# Patient Record
Sex: FEMALE | Race: BLACK OR AFRICAN AMERICAN | NOT HISPANIC OR LATINO | Employment: FULL TIME | URBAN - METROPOLITAN AREA
[De-identification: names, ages, dates, MRNs, and addresses within clinical notes are randomized per-mention and may not be internally consistent; named-entity substitution may affect disease eponyms.]

---

## 2024-09-25 ENCOUNTER — OFFICE VISIT (OUTPATIENT)
Dept: OBSTETRICS AND GYNECOLOGY | Facility: CLINIC | Age: 24
End: 2024-09-25
Payer: COMMERCIAL

## 2024-09-25 ENCOUNTER — PATIENT ROUNDING (BHMG ONLY) (OUTPATIENT)
Dept: OBSTETRICS AND GYNECOLOGY | Facility: CLINIC | Age: 24
End: 2024-09-25
Payer: COMMERCIAL

## 2024-09-25 VITALS
DIASTOLIC BLOOD PRESSURE: 78 MMHG | HEIGHT: 65 IN | WEIGHT: 194.6 LBS | SYSTOLIC BLOOD PRESSURE: 124 MMHG | BODY MASS INDEX: 32.42 KG/M2

## 2024-09-25 DIAGNOSIS — N92.6 MISSED MENSES: ICD-10-CM

## 2024-09-25 DIAGNOSIS — Z32.01 POSITIVE URINE PREGNANCY TEST: Primary | ICD-10-CM

## 2024-09-25 DIAGNOSIS — E66.9 OBESITY (BMI 30-39.9): ICD-10-CM

## 2024-09-25 DIAGNOSIS — O21.9 NAUSEA AND VOMITING DURING PREGNANCY PRIOR TO 22 WEEKS GESTATION: ICD-10-CM

## 2024-09-25 LAB
B-HCG UR QL: POSITIVE
BILIRUB BLD-MCNC: NEGATIVE MG/DL
CLARITY, POC: CLEAR
COLOR UR: YELLOW
EXPIRATION DATE: ABNORMAL
GLUCOSE UR STRIP-MCNC: NEGATIVE MG/DL
INTERNAL NEGATIVE CONTROL: NEGATIVE
INTERNAL POSITIVE CONTROL: POSITIVE
KETONES UR QL: NEGATIVE
LEUKOCYTE EST, POC: NEGATIVE
Lab: 55
NITRITE UR-MCNC: NEGATIVE MG/ML
PH UR: 5 [PH] (ref 5–8)
PROT UR STRIP-MCNC: NEGATIVE MG/DL
RBC # UR STRIP: NEGATIVE /UL
SP GR UR: 1 (ref 1–1.03)
UROBILINOGEN UR QL: NORMAL

## 2024-09-25 RX ORDER — DOXYLAMINE SUCCINATE 25 MG/1
25 TABLET ORAL NIGHTLY
Qty: 30 TABLET | Refills: 1 | Status: SHIPPED | OUTPATIENT
Start: 2024-09-25

## 2024-09-25 RX ORDER — ONDANSETRON 4 MG/1
4 TABLET, FILM COATED ORAL EVERY 4 HOURS PRN
Qty: 30 TABLET | Refills: 1 | Status: SHIPPED | OUTPATIENT
Start: 2024-09-25 | End: 2025-09-25

## 2024-09-25 RX ORDER — DIPHENHYDRAMINE HYDROCHLORIDE 25 MG/1
25 CAPSULE ORAL NIGHTLY
Qty: 30 TABLET | Refills: 1 | Status: SHIPPED | OUTPATIENT
Start: 2024-09-25

## 2024-09-25 NOTE — PROGRESS NOTES
"Confirmation of pregnancy     Chief Complaint   Patient presents with    Amenorrhea         Merly Varner is being seen today for evaluation of absence of menses. Due to her period being late, she tested for pregnancy and had + home UPT. She is a 23 y.o. . This problem is new to me, the examiner.       OB History          1    Para        Term                AB        Living             SAB        IAB        Ectopic        Molar        Multiple        Live Births                    LNMP: 24  Confident with date: No  Taking prenatal vitamins: Yes. Needs RX: No  Planned pregnancy: No  Prior obstetric issues, potential pregnancy concerns: SAB x 2  Family history of genetic issues (includes FOB): denies  Varicella Hx:  vaccine  Flu vaccine: did not get   COVID 19 vaccine: has not had. Booster vaccine: has not had  History of STDs: denies   Current medications: PNV   Last pap smear: , [place]   Smoker: Yes THC  Drug or alcohol abuse: No  H/O physical, emotional or sexual abuse:denies  H/O mental health disorder: Seasonal depression   Prior testing for Cystic Fibrosis Carrier or Sickle Cell Trait- has not had  Prepregnancy BMI - Body mass index is 32.38 kg/m².    No past medical history on file.    No past surgical history on file.    No current outpatient medications on file.    Not on File    Social History     Socioeconomic History    Marital status: Single       No family history on file.    Review of systems     Review of Systems   Constitutional:  Positive for fatigue.   Gastrointestinal:  Positive for nausea.   Genitourinary:  Positive for menstrual problem.       Objective    /78   Ht 165.1 cm (65\")   Wt 88.3 kg (194 lb 9.6 oz)   LMP 2024   BMI 32.38 kg/m²     Physical Exam  Vitals and nursing note reviewed.   Constitutional:       Appearance: Normal appearance.   Musculoskeletal:         General: Normal range of motion.   Skin:     General: Skin is warm and dry. "   Neurological:      General: No focal deficit present.      Mental Status: She is alert and oriented to person, place, and time.   Psychiatric:         Mood and Affect: Mood normal.         Behavior: Behavior normal.         Assessment/Plan      Missed menses: + UPT in office. LNMP 7/24/24 = 9.0 week EGA with an EDC=4/30/25.  Oriented to practice. US today shows a single, viable IUP @ 14.0 weeks. EDC 3/26/25. There is an area of mixed echogenicity on the placenta measuring 2.64 x 1.95cm.     Pregnancy: Disc importance of regular prenatal care. Enc PNV daily. Counseled on providers and on call phone. Disc Tylenol products are ok and encouraged no ibuprofen or ASA in pregnancy.  Disc exercise in pregnancy, diet, expected weight gain, etc. Enc no use of tobacco, vaping, drugs, or alcohol during pregnancy. Rev warn s/s of SAB.     Labs: Pt counseled on genetic screening, Quad screen, AFP, and NIPS.     Body mass index is 32.38 kg/m². Body mass index is 32.38 kg/m².    Smoker- + THC. Enc no use in pregnancy.     6.   COVID19  vaccine- declined    7.  Flu vaccine declined     8.  Nausea/vomiting-  Enc small frequent meals. ERX B6, Unisom and zofran.     9.  FOB Sickle cell trait- Check Hgb electrophoresis.     All questions answered.         Encounter Diagnoses   Name Primary?    Amenorrhea Yes       Diagnoses and all orders for this visit:    Amenorrhea  -     POC Pregnancy, Urine  -     POC Urinalysis Dipstick        RTO in 2 weeks for new OB exam, labs and US= reeval of placenta .     Colleen Miller, APRN  9/25/2024  11:33 EDT

## 2024-10-01 ENCOUNTER — TELEPHONE (OUTPATIENT)
Dept: OBSTETRICS AND GYNECOLOGY | Facility: CLINIC | Age: 24
End: 2024-10-01
Payer: COMMERCIAL

## 2024-10-01 NOTE — TELEPHONE ENCOUNTER
Caller: Merly Varner    Relationship: Self    Best call back number: [phone]    Who is your current provider: DR MCGUIRE     Is your current provider offboarding? NO    Who would you like your new provider to be: DR DAMON HEREDIA     What are your reasons for transferring care: LOCATION AND DELIVERING IN [place]    Additional notes: NEW OB TRANSFER OF CARE SCHEDULING

## 2024-10-01 NOTE — TELEPHONE ENCOUNTER
Spoke with Merly to let her know that Dr Robert will accept her care. Scheduled for New OB on 10/10/24 per pt request.

## 2024-10-01 NOTE — TELEPHONE ENCOUNTER
LMP 7/24/24. Wants to transfer from [facility] to you for location and delivering in [place]. Do you approve for scheduling with you? Thank you

## 2024-10-07 PROBLEM — E66.9 OBESITY (BMI 30-39.9): Status: ACTIVE | Noted: 2024-10-07

## 2024-10-07 PROBLEM — O21.9 NAUSEA AND VOMITING DURING PREGNANCY PRIOR TO 22 WEEKS GESTATION: Status: ACTIVE | Noted: 2024-10-07

## 2024-10-07 PROBLEM — N92.6 MISSED MENSES: Status: ACTIVE | Noted: 2024-10-07

## 2024-10-07 PROBLEM — N91.2 AMENORRHEA: Status: ACTIVE | Noted: 2024-10-07

## 2024-10-10 ENCOUNTER — INITIAL PRENATAL (OUTPATIENT)
Dept: OBSTETRICS AND GYNECOLOGY | Facility: CLINIC | Age: 24
End: 2024-10-10
Payer: COMMERCIAL

## 2024-10-10 VITALS — BODY MASS INDEX: 32.88 KG/M2 | DIASTOLIC BLOOD PRESSURE: 73 MMHG | SYSTOLIC BLOOD PRESSURE: 111 MMHG | WEIGHT: 197.6 LBS

## 2024-10-10 DIAGNOSIS — O09.32 INITIAL OBSTETRIC VISIT IN SECOND TRIMESTER: Primary | ICD-10-CM

## 2024-10-10 DIAGNOSIS — Z3A.15 15 WEEKS GESTATION OF PREGNANCY: ICD-10-CM

## 2024-10-10 RX ORDER — PRENATAL VIT/IRON FUM/FOLIC AC 27MG-0.8MG
TABLET ORAL DAILY
COMMUNITY

## 2024-10-10 NOTE — PROGRESS NOTES
Initial ob visit     CC- Here to initiate prenatal care.    Merly Varner is being seen today for her first obstetrical visit.  She is a 23 y.o.    15w3d gestation.   Patient here for initial prenatal visit.  She is having no complaints.  She has been taking some vitamin B6 and doxylamine for assistance with nausea.  She is taking a prenatal vitamin.  She had an early ultrasound at 13 weeks and [place] that confirmed her due date.  Her partner has sickle cell trait and she has never been tested for this.  She has not had any blood work.    # 1 - Date: 2024, Sex: None, Weight: None, GA: 7w0d, Type: None, Apgar1: None, Apgar5: None, Living: None, Birth Comments: None    # 2 - Date: None, Sex: None, Weight: None, GA: None, Type: None, Apgar1: None, Apgar5: None, Living: None, Birth Comments: None      Current obstetric complaints : None      Prior obstetric issues, potential pregnancy concerns: Negative  Family history of genetic issues (includes FOB): Father the baby with sickle cell trait  Prior infections concerning in pregnancy (Rash, fever in last 2 weeks): Negative  Varicella Hx -vaccine  Prior testing for Cystic Fibrosis Carrier or Sickle Cell Trait-pending  Prepregnancy BMI - Body mass index is 32.88 kg/m².  Hx of HSV for patient or partner : Negative    History reviewed. No pertinent past medical history.    Past Surgical History:   Procedure Laterality Date    WISDOM TOOTH EXTRACTION           Current Outpatient Medications:     ondansetron (Zofran) 4 MG tablet, Take 1 tablet by mouth Every 4 (Four) Hours As Needed for Nausea or Vomiting., Disp: 30 tablet, Rfl: 1    Prenatal Vit-Fe Fumarate-FA (prenatal vitamin 27-0.8) 27-0.8 MG tablet tablet, Take  by mouth Daily., Disp: , Rfl:     vitamin B-6 (PYRIDOXINE) 25 MG tablet, Take 1 tablet by mouth Every Night., Disp: 30 tablet, Rfl: 1    doxylamine (Unisom SleepTabs) 25 MG tablet, Take 1 tablet by mouth Every Night. (Patient not taking: Reported on  10/10/2024), Disp: 30 tablet, Rfl: 1    No Known Allergies    Social History     Socioeconomic History    Marital status: Single   Tobacco Use    Smoking status: Never    Smokeless tobacco: Never   Substance and Sexual Activity    Drug use: Yes     Types: Marijuana    Sexual activity: Yes     Partners: Male       Family History   Problem Relation Age of Onset    Breast cancer Other     Colon cancer Neg Hx     Ovarian cancer Neg Hx     Uterine cancer Neg Hx        Review of systems     Constitutional : Nausea, fatigue mild   : Vaginal bleeding, cramping negative  Breast Tenderness : Mild  All other systems reviewed and are negative       Objective    /73   Wt 89.6 kg (197 lb 9.6 oz)   LMP 06/24/2024 (Exact Date)   BMI 32.88 kg/m²       General Appearance:    Alert, cooperative, in no acute distress, habitus normal   Head:    Normocephalic, without obvious abnormality, atraumatic   Eyes:            Lids and lashes normal, conjunctivae and sclerae normal, no   icterus, no pallor, corneas clear   Ears:    Ears appear intact with no abnormalities noted       Neck:   no thyromegaly, no mass.   Back:                         Lungs:     Clear to auscultation,respirations regular, even and     unlabored    Heart:    Regular rhythm and normal rate, normal S1 and S2, no            murmur, no gallop, no rub, no click   Breast Exam:    No masses, No nipple discharge   Abdomen:     Normal bowel sounds, no masses, no organomegaly, soft        non-tender, non-distended, no guarding, no rebound                 tenderness   Genitalia:       Extremities:   Moves all extremities well, no edema, no cyanosis, no              redness   Pulses:   Pulses palpable and equal bilaterally   Skin:   No bleeding, bruising or rash   Lymph nodes:   No palpable adenopathy   Neurologic:   Sensation intact, A&O times 3      Assessment & Plan     Diagnoses and all orders for this visit:    1. Initial obstetric visit in second trimester  (Primary)  -     Urine Culture - Urine, Urine, Clean Catch  -     Drug Profile Urine - 9 Drugs - Urine, Clean Catch  -     Chlamydia trachomatis, Neisseria gonorrhoeae, Trichomonas vaginalis, PCR - Urine, Urine, Clean Catch  -     OB Panel With HIV  -     [id number] PLUS Core+SCA - Blood,  -     Sickle Cell Screen    2. 15 weeks gestation of pregnancy  -     Urine Culture - Urine, Urine, Clean Catch  -     Drug Profile Urine - 9 Drugs - Urine, Clean Catch  -     Chlamydia trachomatis, Neisseria gonorrhoeae, Trichomonas vaginalis, PCR - Urine, Urine, Clean Catch  -     OB Panel With HIV  -     [id number] PLUS Core+SCA - Blood,        1) Pregnancy at 15w3d  2) will get obstetric labs to include  sickle screen and NIPT today.  Will have her come back in 5 weeks for anatomy ultrasound.         Activity recommendation : 150 minutes/week of moderate intensity aerobic activity unless we limit for bleeding, hypertension or other pregnancy complication   Patient is on Prenatal vitamins  Problem list reviewed and updated.  Reviewed routine prenatal care with the office to include but not limited to   Zika (travel restrictions/ok to use insect repellant), not to changing cat litter, food restrictions, avoidance of alcohol, tobacco and drugs and saunas/hot tubs.   All questions answered.     Dejuan Robert MD   10/10/2024  10:49 EDT

## 2024-10-11 LAB
ABO GROUP BLD: ABNORMAL
BASOPHILS # BLD AUTO: 0 X10E3/UL (ref 0–0.2)
BASOPHILS NFR BLD AUTO: 0 %
BLD GP AB SCN SERPL QL: NEGATIVE
EOSINOPHIL # BLD AUTO: 0 X10E3/UL (ref 0–0.4)
EOSINOPHIL NFR BLD AUTO: 0 %
ERYTHROCYTE [DISTWIDTH] IN BLOOD BY AUTOMATED COUNT: 13 % (ref 11.7–15.4)
HBV SURFACE AG SERPL QL IA: NEGATIVE
HCT VFR BLD AUTO: 38.5 % (ref 34–46.6)
HCV AB SERPL QL IA: NORMAL
HCV IGG SERPL QL IA: NON REACTIVE
HGB BLD-MCNC: 12.5 G/DL (ref 11.1–15.9)
HIV 1+2 AB+HIV1 P24 AG SERPL QL IA: NON REACTIVE
IMM GRANULOCYTES # BLD AUTO: 0.1 X10E3/UL (ref 0–0.1)
IMM GRANULOCYTES NFR BLD AUTO: 1 %
LYMPHOCYTES # BLD AUTO: 1.8 X10E3/UL (ref 0.7–3.1)
LYMPHOCYTES NFR BLD AUTO: 19 %
MCH RBC QN AUTO: 28.7 PG (ref 26.6–33)
MCHC RBC AUTO-ENTMCNC: 32.5 G/DL (ref 31.5–35.7)
MCV RBC AUTO: 89 FL (ref 79–97)
MONOCYTES # BLD AUTO: 0.4 X10E3/UL (ref 0.1–0.9)
MONOCYTES NFR BLD AUTO: 5 %
NEUTROPHILS # BLD AUTO: 7.1 X10E3/UL (ref 1.4–7)
NEUTROPHILS NFR BLD AUTO: 75 %
PLATELET # BLD AUTO: 179 X10E3/UL (ref 150–450)
RBC # BLD AUTO: 4.35 X10E6/UL (ref 3.77–5.28)
RH BLD: POSITIVE
RPR SER QL: NON REACTIVE
RUBV IGG SERPL IA-ACNC: 5.79 INDEX
WBC # BLD AUTO: 9.5 X10E3/UL (ref 3.4–10.8)

## 2024-10-12 LAB
BACTERIA UR CULT: NORMAL
BACTERIA UR CULT: NORMAL

## 2024-10-13 LAB
C TRACH RRNA SPEC QL NAA+PROBE: NEGATIVE
N GONORRHOEA RRNA SPEC QL NAA+PROBE: NEGATIVE
T VAGINALIS RRNA SPEC QL NAA+PROBE: NEGATIVE

## 2024-10-14 LAB
AMPHETAMINES UR QL SCN: NEGATIVE NG/ML
BARBITURATES UR QL SCN: NEGATIVE NG/ML
BENZODIAZ UR QL: NEGATIVE NG/ML
BZE UR QL: NEGATIVE NG/ML
CARBOXYTHC UR QL CFM: POSITIVE
METHADONE UR QL SCN: NEGATIVE NG/ML
OPIATES UR QL: NEGATIVE NG/ML
PCP UR QL SCN: NEGATIVE NG/ML
PROPOXYPH UR QL SCN: NEGATIVE NG/ML

## 2024-11-14 ENCOUNTER — ROUTINE PRENATAL (OUTPATIENT)
Dept: OBSTETRICS AND GYNECOLOGY | Facility: CLINIC | Age: 24
End: 2024-11-14
Payer: COMMERCIAL

## 2024-11-14 VITALS — DIASTOLIC BLOOD PRESSURE: 76 MMHG | WEIGHT: 210 LBS | BODY MASS INDEX: 34.95 KG/M2 | SYSTOLIC BLOOD PRESSURE: 116 MMHG

## 2024-11-14 DIAGNOSIS — Z3A.20 20 WEEKS GESTATION OF PREGNANCY: Primary | ICD-10-CM

## 2024-11-14 DIAGNOSIS — Z34.82 PRENATAL CARE, SUBSEQUENT PREGNANCY IN SECOND TRIMESTER: ICD-10-CM

## 2024-11-14 DIAGNOSIS — O26.879 SHORT CERVIX AFFECTING PREGNANCY: ICD-10-CM

## 2024-11-14 NOTE — PROGRESS NOTES
OB follow up     Merly Varner is a 24 y.o.  20w3d being seen today for her obstetrical visit.  Patient reports no complaints. Fetal movement: normal.  Today's ultrasound shows normal growth and normal fetal anatomy and amniotic fluid.  Cervical length was noted to be 2.4 cm with 0.5 cm of funneling suggested.    Her prenatal care is complicated by (and status): Short cervix at 20 weeks    Review of Systems  Cramping/contractions : Negative  Vaginal bleeding: Negative  Fetal movement normal    /76   Wt 95.3 kg (210 lb)   LMP 2024 (Exact Date)   BMI 34.95 kg/m²     FHT: 142 BPM   Uterine Size: size equals dates       Assessment    Diagnoses and all orders for this visit:    1. 20 weeks gestation of pregnancy (Primary)  -     POC Urinalysis Dipstick    2. Short cervix affecting pregnancy    3. Prenatal care, subsequent pregnancy in second trimester        1) pregnancy at 20w3d         Plan    Reviewed this stage of pregnancy  Problem list updated   Follow up in 1 week to repeat cervical length.  Possibility of vaginal progesterone discussed with patient.  I would also consider maternal-fetal medicine referral if we see significant shortening in the next week.  Will follow closely through 24 weeks.    Dejuan Robert MD   2024  10:29 EST

## 2024-11-15 LAB — HGB S BLD QL SOLY: NEGATIVE

## 2024-11-21 ENCOUNTER — TELEPHONE (OUTPATIENT)
Dept: OBSTETRICS AND GYNECOLOGY | Facility: CLINIC | Age: 24
End: 2024-11-21

## 2024-11-21 NOTE — TELEPHONE ENCOUNTER
Hub staff attempted to follow warm transfer process and was unsuccessful     Caller: Merly Varner    Relationship to patient: Self    Best call back number:    [phone]    Patient is needing:     PT RUNNING 20 MINS LATE TO 8:00 AM APPT

## 2024-12-04 ENCOUNTER — ROUTINE PRENATAL (OUTPATIENT)
Dept: OBSTETRICS AND GYNECOLOGY | Facility: CLINIC | Age: 24
End: 2024-12-04
Payer: COMMERCIAL

## 2024-12-04 VITALS — DIASTOLIC BLOOD PRESSURE: 80 MMHG | SYSTOLIC BLOOD PRESSURE: 127 MMHG | WEIGHT: 220 LBS | BODY MASS INDEX: 36.61 KG/M2

## 2024-12-04 DIAGNOSIS — K59.09 OTHER CONSTIPATION: ICD-10-CM

## 2024-12-04 DIAGNOSIS — Z34.92 SECOND TRIMESTER PREGNANCY: Primary | ICD-10-CM

## 2024-12-04 DIAGNOSIS — Z3A.23 23 WEEKS GESTATION OF PREGNANCY: ICD-10-CM

## 2024-12-04 DIAGNOSIS — O26.879 SHORT CERVIX AFFECTING PREGNANCY: ICD-10-CM

## 2024-12-04 LAB
GLUCOSE UR STRIP-MCNC: NEGATIVE MG/DL
PROT UR STRIP-MCNC: NEGATIVE MG/DL

## 2024-12-04 RX ORDER — DOCUSATE SODIUM 100 MG/1
100 CAPSULE, LIQUID FILLED ORAL 2 TIMES DAILY
Qty: 60 CAPSULE | Refills: 1 | Status: SHIPPED | OUTPATIENT
Start: 2024-12-04

## 2024-12-04 RX ORDER — POLYETHYLENE GLYCOL 3350 17 G/17G
17 POWDER, FOR SOLUTION ORAL DAILY
Qty: 572 G | Refills: 5 | Status: SHIPPED | OUTPATIENT
Start: 2024-12-04

## 2024-12-04 NOTE — PROGRESS NOTES
OB VISIT 23 WEEKS      Chief Complaint   Patient presents with    Routine Prenatal Visit         Merly is a 24 y.o.  23w2d being seen today for her obstetrical visit.  Patient reports no complaints. Fetal movement: normal. The patient currently sees Dr. Robert.       REVIEW OF SYSTEMS  Cramping/contractions: denies  Vaginal bleeding: denies  Fetal movement: present    Review of Systems    /80   Wt 99.8 kg (220 lb)   LMP 2024 (Exact Date)   BMI 36.61 kg/m²     Prenatal Assessment  Fetal Heart Rate: 144  Movement: Present  Edema  LLE Edema: None  RLE Edema: None  Facial Edema: None    OBGyn Exam    ASSESSMENT/PLAN    Diagnoses and all orders for this visit:    1. Second trimester pregnancy (Primary)  -     POC Urinalysis Dipstick    2. 23 weeks gestation of pregnancy    3. Short cervix affecting pregnancy  -     Ambulatory Referral to MFM/Perinatology    4. Other constipation  -     polyethylene glycol (MIRALAX) 17 GM/SCOOP powder; Take 17 g by mouth Daily.  Dispense: 572 g; Refill: 5  -     docusate sodium (Colace) 100 MG capsule; Take 1 capsule by mouth 2 (Two) Times a Day.  Dispense: 60 capsule; Refill: 1         Urine dip today: negative protein, negative glucose.   Labs and 1 hour GTT next visit.   Recommended off work for now until seen by MFM. MFM referral entered for short cervix.    Discussed use of stool softeners and miralax to prevent straining during bowel movements.   Reviewed this stage of pregnancy.  Problem list updated.     Follow up in 2 weeks with Dr. Robert.     I spent 15 minutes caring for Merly on this date of service. This time includes time spent by me in the following activities: preparing for the visit, reviewing tests, performing a medically appropriate examination and/or evaluation, counseling and educating the patient/family/caregiver, referring and communicating with other health care professionals, documenting information in the medical record, independently  interpreting results and communicating that information with the patient/family/caregiver, care coordination, ordering medications, ordering test(s), ordering procedure(s), obtaining a separately obtained history, and reviewing a separately obtained history.     Katalina Goldstein CNM  12/8/2024  10:55 EST

## 2024-12-06 ENCOUNTER — HOSPITAL ENCOUNTER (OUTPATIENT)
Dept: ULTRASOUND IMAGING | Facility: HOSPITAL | Age: 24
Discharge: HOME | End: 2024-12-06
Admitting: OBSTETRICS & GYNECOLOGY
Payer: COMMERCIAL

## 2024-12-06 ENCOUNTER — ANESTHESIA (OUTPATIENT)
Dept: LABOR AND DELIVERY | Facility: HOSPITAL | Age: 24
End: 2024-12-06
Payer: COMMERCIAL

## 2024-12-06 ENCOUNTER — ANESTHESIA EVENT (OUTPATIENT)
Dept: LABOR AND DELIVERY | Facility: HOSPITAL | Age: 24
End: 2024-12-06
Payer: COMMERCIAL

## 2024-12-06 ENCOUNTER — HOSPITAL ENCOUNTER (OUTPATIENT)
Facility: HOSPITAL | Age: 24
Setting detail: OBSERVATION
Discharge: HOME | End: 2024-12-07
Attending: OBSTETRICS & GYNECOLOGY | Admitting: OBSTETRICS & GYNECOLOGY
Payer: COMMERCIAL

## 2024-12-06 ENCOUNTER — OFFICE VISIT (OUTPATIENT)
Dept: OBSTETRICS AND GYNECOLOGY | Facility: CLINIC | Age: 24
End: 2024-12-06
Payer: COMMERCIAL

## 2024-12-06 ENCOUNTER — TRANSCRIBE ORDERS (OUTPATIENT)
Dept: OBSTETRICS AND GYNECOLOGY | Facility: CLINIC | Age: 24
End: 2024-12-06

## 2024-12-06 VITALS — HEART RATE: 101 BPM | SYSTOLIC BLOOD PRESSURE: 123 MMHG | DIASTOLIC BLOOD PRESSURE: 71 MMHG | TEMPERATURE: 98.4 F

## 2024-12-06 DIAGNOSIS — O26.879 SHORT CERVIX AFFECTING PREGNANCY: Primary | ICD-10-CM

## 2024-12-06 DIAGNOSIS — O26.879 SHORT CERVIX AFFECTING PREGNANCY: ICD-10-CM

## 2024-12-06 LAB
ABO GROUP BLD: NORMAL
BLD GP AB SCN SERPL QL: NEGATIVE
DEPRECATED RDW RBC AUTO: 41.9 FL (ref 37–54)
ERYTHROCYTE [DISTWIDTH] IN BLOOD BY AUTOMATED COUNT: 13 % (ref 12.3–15.4)
HCT VFR BLD AUTO: 41.5 % (ref 34–46.6)
HGB BLD-MCNC: 13.8 G/DL (ref 12–15.9)
MCH RBC QN AUTO: 29.6 PG (ref 26.6–33)
MCHC RBC AUTO-ENTMCNC: 33.3 G/DL (ref 31.5–35.7)
MCV RBC AUTO: 89.1 FL (ref 79–97)
PLATELET # BLD AUTO: 220 10*3/MM3 (ref 140–450)
PMV BLD AUTO: 9.3 FL (ref 6–12)
RBC # BLD AUTO: 4.66 10*6/MM3 (ref 3.77–5.28)
RH BLD: POSITIVE
T&S EXPIRATION DATE: NORMAL
TREPONEMA PALLIDUM IGG+IGM AB [PRESENCE] IN SERUM OR PLASMA BY IMMUNOASSAY: NORMAL
WBC NRBC COR # BLD AUTO: 15.31 10*3/MM3 (ref 3.4–10.8)

## 2024-12-06 PROCEDURE — 85027 COMPLETE CBC AUTOMATED: CPT | Performed by: OBSTETRICS & GYNECOLOGY

## 2024-12-06 PROCEDURE — 76811 OB US DETAILED SNGL FETUS: CPT

## 2024-12-06 PROCEDURE — 86900 BLOOD TYPING SEROLOGIC ABO: CPT | Performed by: OBSTETRICS & GYNECOLOGY

## 2024-12-06 PROCEDURE — 25010000002 ONDANSETRON PER 1 MG: Performed by: ANESTHESIOLOGY

## 2024-12-06 PROCEDURE — G0378 HOSPITAL OBSERVATION PER HR: HCPCS

## 2024-12-06 PROCEDURE — 25010000002 CEFAZOLIN PER 500 MG: Performed by: OBSTETRICS & GYNECOLOGY

## 2024-12-06 PROCEDURE — 25810000003 LACTATED RINGERS PER 1000 ML: Performed by: OBSTETRICS & GYNECOLOGY

## 2024-12-06 PROCEDURE — 25010000002 BUPIVACAINE PF 0.75 % SOLUTION: Performed by: ANESTHESIOLOGY

## 2024-12-06 PROCEDURE — 86780 TREPONEMA PALLIDUM: CPT | Performed by: OBSTETRICS & GYNECOLOGY

## 2024-12-06 PROCEDURE — 59320 REVISION OF CERVIX: CPT | Performed by: OBSTETRICS & GYNECOLOGY

## 2024-12-06 PROCEDURE — 86901 BLOOD TYPING SEROLOGIC RH(D): CPT | Performed by: OBSTETRICS & GYNECOLOGY

## 2024-12-06 PROCEDURE — 76817 TRANSVAGINAL US OBSTETRIC: CPT

## 2024-12-06 PROCEDURE — 86850 RBC ANTIBODY SCREEN: CPT | Performed by: OBSTETRICS & GYNECOLOGY

## 2024-12-06 RX ORDER — ACETAMINOPHEN 325 MG/1
650 TABLET ORAL EVERY 4 HOURS PRN
Status: DISCONTINUED | OUTPATIENT
Start: 2024-12-06 | End: 2024-12-07 | Stop reason: HOSPADM

## 2024-12-06 RX ORDER — ACETAMINOPHEN 500 MG
1000 TABLET ORAL 3 TIMES DAILY PRN
Status: DISCONTINUED | OUTPATIENT
Start: 2024-12-06 | End: 2024-12-07 | Stop reason: HOSPADM

## 2024-12-06 RX ORDER — AZITHROMYCIN 250 MG/1
TABLET, FILM COATED ORAL
Qty: 6 TABLET | Refills: 0 | Status: SHIPPED | OUTPATIENT
Start: 2024-12-06 | End: 2024-12-11

## 2024-12-06 RX ORDER — HYDROMORPHONE HYDROCHLORIDE 1 MG/ML
0.5 INJECTION, SOLUTION INTRAMUSCULAR; INTRAVENOUS; SUBCUTANEOUS
Status: DISCONTINUED | OUTPATIENT
Start: 2024-12-06 | End: 2024-12-07 | Stop reason: HOSPADM

## 2024-12-06 RX ORDER — DOCUSATE SODIUM 100 MG/1
100 CAPSULE, LIQUID FILLED ORAL 2 TIMES DAILY PRN
Status: DISCONTINUED | OUTPATIENT
Start: 2024-12-06 | End: 2024-12-07 | Stop reason: HOSPADM

## 2024-12-06 RX ORDER — DIPHENHYDRAMINE HYDROCHLORIDE 50 MG/ML
25 INJECTION INTRAMUSCULAR; INTRAVENOUS EVERY 4 HOURS PRN
Status: CANCELLED | OUTPATIENT
Start: 2024-12-06

## 2024-12-06 RX ORDER — HYDROMORPHONE HYDROCHLORIDE 1 MG/ML
0.5 INJECTION, SOLUTION INTRAMUSCULAR; INTRAVENOUS; SUBCUTANEOUS
Status: CANCELLED | OUTPATIENT
Start: 2024-12-06 | End: 2024-12-07

## 2024-12-06 RX ORDER — PROGESTERONE 200 MG/1
200 CAPSULE ORAL
Qty: 90 CAPSULE | Refills: 5 | Status: SHIPPED | OUTPATIENT
Start: 2024-12-06

## 2024-12-06 RX ORDER — ONDANSETRON 4 MG/1
8 TABLET, ORALLY DISINTEGRATING ORAL EVERY 8 HOURS PRN
Status: DISCONTINUED | OUTPATIENT
Start: 2024-12-06 | End: 2024-12-07 | Stop reason: HOSPADM

## 2024-12-06 RX ORDER — BISACODYL 10 MG
10 SUPPOSITORY, RECTAL RECTAL DAILY PRN
Status: DISCONTINUED | OUTPATIENT
Start: 2024-12-06 | End: 2024-12-07 | Stop reason: HOSPADM

## 2024-12-06 RX ORDER — INDOMETHACIN 50 MG/1
50 CAPSULE ORAL 4 TIMES DAILY
Qty: 8 CAPSULE | Refills: 0 | Status: SHIPPED | OUTPATIENT
Start: 2024-12-06 | End: 2024-12-08

## 2024-12-06 RX ORDER — ONDANSETRON 2 MG/ML
4 INJECTION INTRAMUSCULAR; INTRAVENOUS EVERY 8 HOURS PRN
Status: DISCONTINUED | OUTPATIENT
Start: 2024-12-06 | End: 2024-12-07 | Stop reason: HOSPADM

## 2024-12-06 RX ORDER — LIDOCAINE HYDROCHLORIDE 10 MG/ML
0.5 INJECTION, SOLUTION INFILTRATION; PERINEURAL ONCE AS NEEDED
Status: DISCONTINUED | OUTPATIENT
Start: 2024-12-06 | End: 2024-12-07 | Stop reason: HOSPADM

## 2024-12-06 RX ORDER — BUPIVACAINE HYDROCHLORIDE 7.5 MG/ML
INJECTION, SOLUTION EPIDURAL; RETROBULBAR
Status: COMPLETED | OUTPATIENT
Start: 2024-12-06 | End: 2024-12-06

## 2024-12-06 RX ORDER — DROPERIDOL 2.5 MG/ML
0.62 INJECTION, SOLUTION INTRAMUSCULAR; INTRAVENOUS
Status: CANCELLED | OUTPATIENT
Start: 2024-12-06

## 2024-12-06 RX ORDER — ONDANSETRON 2 MG/ML
INJECTION INTRAMUSCULAR; INTRAVENOUS AS NEEDED
Status: DISCONTINUED | OUTPATIENT
Start: 2024-12-06 | End: 2024-12-06 | Stop reason: SURG

## 2024-12-06 RX ORDER — NALOXONE HCL 0.4 MG/ML
0.2 VIAL (ML) INJECTION
Status: CANCELLED | OUTPATIENT
Start: 2024-12-06

## 2024-12-06 RX ORDER — ONDANSETRON 2 MG/ML
4 INJECTION INTRAMUSCULAR; INTRAVENOUS ONCE AS NEEDED
Status: CANCELLED | OUTPATIENT
Start: 2024-12-06

## 2024-12-06 RX ORDER — SODIUM CHLORIDE 9 MG/ML
40 INJECTION, SOLUTION INTRAVENOUS AS NEEDED
Status: DISCONTINUED | OUTPATIENT
Start: 2024-12-06 | End: 2024-12-07 | Stop reason: HOSPADM

## 2024-12-06 RX ORDER — DIPHENHYDRAMINE HCL 25 MG
25 CAPSULE ORAL EVERY 4 HOURS PRN
Status: CANCELLED | OUTPATIENT
Start: 2024-12-06

## 2024-12-06 RX ORDER — SODIUM CHLORIDE 0.9 % (FLUSH) 0.9 %
10 SYRINGE (ML) INJECTION EVERY 12 HOURS SCHEDULED
Status: DISCONTINUED | OUTPATIENT
Start: 2024-12-06 | End: 2024-12-07 | Stop reason: HOSPADM

## 2024-12-06 RX ORDER — SODIUM CHLORIDE, SODIUM LACTATE, POTASSIUM CHLORIDE, CALCIUM CHLORIDE 600; 310; 30; 20 MG/100ML; MG/100ML; MG/100ML; MG/100ML
125 INJECTION, SOLUTION INTRAVENOUS CONTINUOUS
Status: ACTIVE | OUTPATIENT
Start: 2024-12-06 | End: 2024-12-06

## 2024-12-06 RX ORDER — INDOMETHACIN 25 MG/1
50 CAPSULE ORAL 4 TIMES DAILY
Status: DISCONTINUED | OUTPATIENT
Start: 2024-12-06 | End: 2024-12-07 | Stop reason: HOSPADM

## 2024-12-06 RX ORDER — MORPHINE SULFATE 2 MG/ML
2 INJECTION, SOLUTION INTRAMUSCULAR; INTRAVENOUS
Status: CANCELLED | OUTPATIENT
Start: 2024-12-06 | End: 2024-12-07

## 2024-12-06 RX ORDER — SODIUM CHLORIDE 0.9 % (FLUSH) 0.9 %
10 SYRINGE (ML) INJECTION AS NEEDED
Status: DISCONTINUED | OUTPATIENT
Start: 2024-12-06 | End: 2024-12-07 | Stop reason: HOSPADM

## 2024-12-06 RX ADMIN — BUPIVACAINE HYDROCHLORIDE 1.5 ML: 7.5 INJECTION, SOLUTION EPIDURAL; RETROBULBAR at 20:02

## 2024-12-06 RX ADMIN — SODIUM CHLORIDE, POTASSIUM CHLORIDE, SODIUM LACTATE AND CALCIUM CHLORIDE 125 ML/HR: 600; 310; 30; 20 INJECTION, SOLUTION INTRAVENOUS at 17:08

## 2024-12-06 RX ADMIN — SODIUM CHLORIDE 2000 MG: 900 INJECTION INTRAVENOUS at 19:40

## 2024-12-06 RX ADMIN — INDOMETHACIN 50 MG: 50 CAPSULE ORAL at 21:28

## 2024-12-06 RX ADMIN — INDOMETHACIN 50 MG: 50 CAPSULE ORAL at 17:49

## 2024-12-06 RX ADMIN — ONDANSETRON 4 MG: 2 INJECTION INTRAMUSCULAR; INTRAVENOUS at 20:38

## 2024-12-06 NOTE — PROGRESS NOTES
MATERNAL FETAL MEDICINE Consult Note    Dear Dr Katalina Goldstein CNM:    Thank you for your kind referral of Merly LAM Varner.  As you know, she is a 24 y.o.   23w4d gestation (Estimated Date of Delivery: 3/31/25). This is a consult.      Her antepartum course is complicated by:  Short cervix-5 mm today.  FT on dilation    HPI: Today, she denies headache, blurry vision, RUQ pain. No vaginal bleeding, no contractions.     Review of History:  Past Medical History:   Diagnosis Date    Chlamydia 2018    treated     Past Surgical History:   Procedure Laterality Date    WISDOM TOOTH EXTRACTION         Social History     Socioeconomic History    Marital status: Single   Tobacco Use    Smoking status: Never     Passive exposure: Never    Smokeless tobacco: Never   Vaping Use    Vaping status: Never Used   Substance and Sexual Activity    Drug use: Not Currently     Comment: history of marjuana use, stopped at 16 weeks 10/13/2024    Sexual activity: Yes     Partners: Male     Family History   Problem Relation Age of Onset    Cancer Other     Breast cancer Other     Colon cancer Neg Hx     Ovarian cancer Neg Hx     Uterine cancer Neg Hx       No Known Allergies   No current facility-administered medications on file prior to visit.     Current Outpatient Medications on File Prior to Visit   Medication Sig Dispense Refill    docusate sodium (Colace) 100 MG capsule Take 1 capsule by mouth 2 (Two) Times a Day. 60 capsule 1    doxylamine (Unisom SleepTabs) 25 MG tablet Take 1 tablet by mouth Every Night. 30 tablet 1    ondansetron (Zofran) 4 MG tablet Take 1 tablet by mouth Every 4 (Four) Hours As Needed for Nausea or Vomiting. 30 tablet 1    Prenatal Vit-Fe Fumarate-FA (prenatal vitamin 27-0.8) 27-0.8 MG tablet tablet Take  by mouth Daily.      vitamin B-6 (PYRIDOXINE) 25 MG tablet Take 1 tablet by mouth Every Night. 30 tablet 1    polyethylene glycol (MIRALAX) 17 GM/SCOOP powder Take 17 g by mouth Daily. 572 g 5        Past  obstetric, gynecological, medical, surgical, family and social history reviewed.  Relevant lab work and imaging reviewed.    Review of systems  Constitutional:  denies fever, chills, malaise.   ENT/Mouth:  denies sore throat, tinnitus  Eyes: denies vision changes/pain  CV:  denies chest pain  Respiratory:  denies cough/SOB  GI:  denies N/V, diarrhea, abdominal pain.    :   denies dysuria  Skin:  denies lesions or pruritus   Neuro:  denies weakness, focal neurologic symptoms    Vitals:    24 1533   BP: 123/71   BP Location: Right arm   Patient Position: Sitting   Pulse: 101   Temp: 98.4 °F (36.9 °C)   TempSrc: Temporal       PHYSICAL EXAM   GENERAL: Not in acute distress, AAOx3, pleasant  CARDIO: regular rate and rhythm  PULM: symmetric chest rise, speaking in complete sentences without difficulty  NEURO: awake, alert and oriented to person, place, and time  ABDOMINAL: No fundal tenderness, no rebound or guarding, gravid  EXTREMITIES: no bilateral lower extremity edema/tenderness  SKIN: Warm, well-perfused      ULTRASOUND   Please view full ultrasound note on Imaging tab in [employer].      ASSESSMENT/COUNSELIN y.o.   23w4d gestation (Estimated Date of Delivery: 3/31/25).       -Pregnancy  [ X ] stable  [   ] improving [  ] worsening    Diagnoses and all orders for this visit:    1. Short cervix affecting pregnancy (Primary)      Short cervix, barely measurable 5 mm  I used the [employer] website calculator taking into account maternal history and cervical length.  Below are the likelihood of her delivering  based on these indicators.    Risk of delivery before 28 weeks:  29.3 %   Risk of delivery before 31 weeks:  38.2 %   Risk of delivery before 34 weeks:  51.7 %   Risk of delivery before 37 weeks:  99.0 %      We discussed that this is based on population data and each individual is different.  I discussed that she is certainly at increased risk for a periviable or early   delivery above the general population.       She was noted to be dilated FT cm and her cervix is <10 mm on TVUS--making her at significant risk for /periviable delivery.  We discussed that I recommend a cerclage in this setting.  I discussed risks of a cerclage including risk of bleeding, infection, damage to bowel/bladder, PPROM, fetal demise.  We discussed modified activity and pelvic rest.   She will do vaginal progesterone starting at her post op visit in a few weeks.       She will do vaginal progesterone until 37 weeks and we will do a 72 hour course of indocin and a 24 hour course of ancef that she can start in hospital.       We will also observe on toco to make sure she is not having contractions prior to going to the OR.       We discussed her options--cerclage and hope to prevent a periviable/previable delivery. I told her in no uncertain terms that when pts are dilated or significantly shortened, they are at risk for infection/ PPROM even after cerclage.  However, the likelihood of a periviable delivery if she does nothing is extremely high over the coming weeks.  She understands and desires to move forward with cerclage knowing the risks.      We will admit and plan for cerclage this evening.      She will stay for 24 hour of IV antibiotics and indocin after.       Summary of Plan  -To hospital for cerclage this evening.   -24 hours of q8 ancef and q8 indocin while inpatient.  -[place] x 30 min to 1 hour prior to procedure  -Meds to beds for indocin/Z-pack, progesterone.  Pt has bowel regimen (colace) at home.    -Plan to DC tomorrow if pt doing well     Follow-up: 1-2 weeks post op.     Thank you for the consult and opportunity to care for this patient.  Please feel free to reach out with any questions or concerns.      I spent 65 minutes caring for this patient on this date of service. This time includes time spent by me in the following activities: preparing for the visit, reviewing tests,  obtaining and/or reviewing a separately obtained history, performing a medically appropriate examination and/or evaluation, counseling and educating the patient/family/caregiver and independently interpreting results and communicating that information with the patient/family/caregiver with greater than 50% spent in counseling and coordination of care.       I spent 4 minutes on the separately reported service of US imaging not included in the time used to support the E/M service also reported today.      Haley Kirkland MD FACOG  Maternal Fetal Medicine-[facility]  Office: [phone]  [email or web address]

## 2024-12-06 NOTE — ANESTHESIA PREPROCEDURE EVALUATION
Anesthesia Evaluation     no history of anesthetic complications:                Airway   Mallampati: II  TM distance: >3 FB  Neck ROM: full  Dental - normal exam     Pulmonary    (-) shortness of breath, recent URI  Cardiovascular     (-) hypertension, valvular problems/murmurs      Neuro/Psych  (+) syncope  (-) dizziness/light headedness  GI/Hepatic/Renal/Endo    (-)  obesity, diabetes    Musculoskeletal     Abdominal    Substance History      OB/GYN    (+) Pregnant (22 wks)        Other                    Anesthesia Plan    ASA 2     spinal       Anesthetic plan, risks, benefits, and alternatives have been provided, discussed and informed consent has been obtained with: patient.    CODE STATUS:    Level Of Support Discussed With: Patient  Code Status (Patient has no pulse and is not breathing): CPR (Attempt to Resuscitate)  Medical Interventions (Patient has pulse or is breathing): Full Support

## 2024-12-06 NOTE — PROGRESS NOTES
Pt reports that she is doing well and denies vaginal bleeding, cramping, contractions or LOF at this time. Reports active fetal movement. Reviewed when to call OB office or present to L&D for evaluation with symptoms such as decreased fetal movement, vaginal bleeding, LOF or ctxs. Pt verbalized understanding. Denies HA, visual changes or epigastric pain. Denies any additional complaints at time of appointment. Next OB appointment scheduled for 12/17.    Vitals:    12/06/24 1533   BP: 123/71   Pulse: 101   Temp: 98.4 °F (36.9 °C)

## 2024-12-06 NOTE — H&P
MATERNAL FETAL MEDICINE Consult Note    Dear Dr Francis Known Provider:    Thank you for your kind referral of Merly LAM Varner.  As you know, she is a 24 y.o.   23w4d gestation (Estimated Date of Delivery: 3/31/25). This is a consult.      Her antepartum course is complicated by:  Short cervix-5 mm today.  FT on dilation    HPI: Today, she denies headache, blurry vision, RUQ pain. No vaginal bleeding, no contractions.     Review of History:  Past Medical History:   Diagnosis Date    Chlamydia 2018    treated     Past Surgical History:   Procedure Laterality Date    WISDOM TOOTH EXTRACTION         Social History     Socioeconomic History    Marital status: Single   Tobacco Use    Smoking status: Never     Passive exposure: Never    Smokeless tobacco: Never   Vaping Use    Vaping status: Never Used   Substance and Sexual Activity    Drug use: Not Currently     Comment: history of marjuana use, stopped at 16 weeks 10/13/2024    Sexual activity: Yes     Partners: Male     Family History   Problem Relation Age of Onset    Cancer Other     Breast cancer Other     Colon cancer Neg Hx     Ovarian cancer Neg Hx     Uterine cancer Neg Hx       No Known Allergies   No current facility-administered medications on file prior to encounter.     Current Outpatient Medications on File Prior to Encounter   Medication Sig Dispense Refill    docusate sodium (Colace) 100 MG capsule Take 1 capsule by mouth 2 (Two) Times a Day. 60 capsule 1    doxylamine (Unisom SleepTabs) 25 MG tablet Take 1 tablet by mouth Every Night. 30 tablet 1    ondansetron (Zofran) 4 MG tablet Take 1 tablet by mouth Every 4 (Four) Hours As Needed for Nausea or Vomiting. 30 tablet 1    polyethylene glycol (MIRALAX) 17 GM/SCOOP powder Take 17 g by mouth Daily. 572 g 5    Prenatal Vit-Fe Fumarate-FA (prenatal vitamin 27-0.8) 27-0.8 MG tablet tablet Take  by mouth Daily.      vitamin B-6 (PYRIDOXINE) 25 MG tablet Take 1 tablet by mouth Every Night. 30 tablet 1     "    Past obstetric, gynecological, medical, surgical, family and social history reviewed.  Relevant lab work and imaging reviewed.    Review of systems  Constitutional:  denies fever, chills, malaise.   ENT/Mouth:  denies sore throat, tinnitus  Eyes: denies vision changes/pain  CV:  denies chest pain  Respiratory:  denies cough/SOB  GI:  denies N/V, diarrhea, abdominal pain.    :   denies dysuria  Skin:  denies lesions or pruritus   Neuro:  denies weakness, focal neurologic symptoms    Vitals:    24 1649 24 1650 24 1655 24 1723   BP: 136/76      BP Location: Right arm      Patient Position: Lying      Pulse: 103 100 103    Resp: 20      Temp: 98.3 °F (36.8 °C)      TempSrc: Oral      SpO2: 100% 100% 100%    Weight:    99.8 kg (220 lb)   Height:    165.1 cm (65\")       PHYSICAL EXAM   GENERAL: Not in acute distress, AAOx3, pleasant  CARDIO: regular rate and rhythm  PULM: symmetric chest rise, speaking in complete sentences without difficulty  NEURO: awake, alert and oriented to person, place, and time  ABDOMINAL: No fundal tenderness, no rebound or guarding, gravid  EXTREMITIES: no bilateral lower extremity edema/tenderness  SKIN: Warm, well-perfused      ULTRASOUND   Please view full ultrasound note on Imaging tab in ViewPoint.      ASSESSMENT/COUNSELIN y.o.   23w4d gestation (Estimated Date of Delivery: 3/31/25).       -Pregnancy  [ X ] stable  [   ] improving [  ] worsening    There are no diagnoses linked to this encounter.    Short cervix, barely measurable 5 mm  I used the [employer] website calculator taking into account maternal history and cervical length.  Below are the likelihood of her delivering  based on these indicators.    Risk of delivery before 28 weeks:  29.3 %   Risk of delivery before 31 weeks:  38.2 %   Risk of delivery before 34 weeks:  51.7 %   Risk of delivery before 37 weeks:  99.0 %      We discussed that this is based on population data " and each individual is different.  I discussed that she is certainly at increased risk for a periviable or early  delivery above the general population.       She was noted to be dilated FT cm and her cervix is <10 mm on TVUS--making her at significant risk for /periviable delivery.  We discussed that I recommend a cerclage in this setting.  I discussed risks of a cerclage including risk of bleeding, infection, damage to bowel/bladder, PPROM, fetal demise.  We discussed modified activity and pelvic rest.   She will do vaginal progesterone starting at her post op visit in a few weeks.       She will do vaginal progesterone until 37 weeks and we will do a 72 hour course of indocin and a 24 hour course of ancef that she can start in hospital.       We will also observe on toco to make sure she is not having contractions prior to going to the OR.       We discussed her options--cerclage and hope to prevent a periviable/previable delivery. I told her in no uncertain terms that when pts are dilated or significantly shortened, they are at risk for infection/ PPROM even after cerclage.  However, the likelihood of a periviable delivery if she does nothing is extremely high over the coming weeks.  She understands and desires to move forward with cerclage knowing the risks.      We will admit and plan for cerclage this evening.      She will stay for 24 hour of IV antibiotics and indocin after.       Summary of Plan  -To hospital for cerclage this evening.   -24 hours of q8 ancef and q8 indocin while inpatient.  -[place] x 30 min to 1 hour prior to procedure  -Meds to beds for indocin/Z-pack, progesterone.  Pt has bowel regimen (colace) at home.    -Plan to DC tomorrow if pt doing well     Follow-up: 1-2 weeks post op.     Thank you for the consult and opportunity to care for this patient.  Please feel free to reach out with any questions or concerns.      I spent 65 minutes caring for this patient on this date of  service. This time includes time spent by me in the following activities: preparing for the visit, reviewing tests, obtaining and/or reviewing a separately obtained history, performing a medically appropriate examination and/or evaluation, counseling and educating the patient/family/caregiver and independently interpreting results and communicating that information with the patient/family/caregiver with greater than 50% spent in counseling and coordination of care.       I spent 4 minutes on the separately reported service of US imaging not included in the time used to support the E/M service also reported today.      Haley Kirkland MD FACOG  Maternal Fetal Medicine-[facility]  Office: [phone]  [email or web address]

## 2024-12-06 NOTE — LETTER
2024     Katalina Goldstein CNM  [address]  [address]  [place] [id number]    Patient: Merly Varner   YOB: 2000   Date of Visit: 2024       Dear Katalina Goldstein CNM    Merly Varner was in my office today. Below is a copy of my note.    If you have questions, please do not hesitate to call me. I look forward to following Merly along with you.         Sincerely,        Haley Kirkland MD    MATERNAL FETAL MEDICINE Consult Note    Dear Dr Katalina Goldstein CNM:    Thank you for your kind referral of Merly Varner.  As you know, she is a 24 y.o.   23w4d gestation (Estimated Date of Delivery: 3/31/25). This is a consult.      Her antepartum course is complicated by:  Short cervix-5 mm today.  FT on dilation    HPI: Today, she denies headache, blurry vision, RUQ pain. No vaginal bleeding, no contractions.     Review of History:  Past Medical History:   Diagnosis Date   • Chlamydia 2018    treated     Past Surgical History:   Procedure Laterality Date   • WISDOM TOOTH EXTRACTION         Social History     Socioeconomic History   • Marital status: Single   Tobacco Use   • Smoking status: Never     Passive exposure: Never   • Smokeless tobacco: Never   Vaping Use   • Vaping status: Never Used   Substance and Sexual Activity   • Drug use: Not Currently     Comment: history of marjuana use, stopped at 16 weeks 10/13/2024   • Sexual activity: Yes     Partners: Male     Family History   Problem Relation Age of Onset   • Cancer Other    • Breast cancer Other    • Colon cancer Neg Hx    • Ovarian cancer Neg Hx    • Uterine cancer Neg Hx       No Known Allergies   No current facility-administered medications on file prior to visit.     Current Outpatient Medications on File Prior to Visit   Medication Sig Dispense Refill   • docusate sodium (Colace) 100 MG capsule Take 1 capsule by mouth 2 (Two) Times a Day. 60 capsule 1   • doxylamine (Unisom SleepTabs) 25 MG tablet Take 1 tablet  by mouth Every Night. 30 tablet 1   • ondansetron (Zofran) 4 MG tablet Take 1 tablet by mouth Every 4 (Four) Hours As Needed for Nausea or Vomiting. 30 tablet 1   • Prenatal Vit-Fe Fumarate-FA (prenatal vitamin 27-0.8) 27-0.8 MG tablet tablet Take  by mouth Daily.     • vitamin B-6 (PYRIDOXINE) 25 MG tablet Take 1 tablet by mouth Every Night. 30 tablet 1   • polyethylene glycol (MIRALAX) 17 GM/SCOOP powder Take 17 g by mouth Daily. 572 g 5        Past obstetric, gynecological, medical, surgical, family and social history reviewed.  Relevant lab work and imaging reviewed.    Review of systems  Constitutional:  denies fever, chills, malaise.   ENT/Mouth:  denies sore throat, tinnitus  Eyes: denies vision changes/pain  CV:  denies chest pain  Respiratory:  denies cough/SOB  GI:  denies N/V, diarrhea, abdominal pain.    :   denies dysuria  Skin:  denies lesions or pruritus   Neuro:  denies weakness, focal neurologic symptoms    Vitals:    24 1533   BP: 123/71   BP Location: Right arm   Patient Position: Sitting   Pulse: 101   Temp: 98.4 °F (36.9 °C)   TempSrc: Temporal       PHYSICAL EXAM   GENERAL: Not in acute distress, AAOx3, pleasant  CARDIO: regular rate and rhythm  PULM: symmetric chest rise, speaking in complete sentences without difficulty  NEURO: awake, alert and oriented to person, place, and time  ABDOMINAL: No fundal tenderness, no rebound or guarding, gravid  EXTREMITIES: no bilateral lower extremity edema/tenderness  SKIN: Warm, well-perfused      ULTRASOUND   Please view full ultrasound note on Imaging tab in ViewPoint.      ASSESSMENT/COUNSELIN y.o.   23w4d gestation (Estimated Date of Delivery: 3/31/25).       -Pregnancy  [ X ] stable  [   ] improving [  ] worsening    Diagnoses and all orders for this visit:    1. Short cervix affecting pregnancy (Primary)      Short cervix, barely measurable 5 mm  I used the Fetal Foundation website calculator taking into account maternal history  and cervical length.  Below are the likelihood of her delivering  based on these indicators.    Risk of delivery before 28 weeks:  29.3 %   Risk of delivery before 31 weeks:  38.2 %   Risk of delivery before 34 weeks:  51.7 %   Risk of delivery before 37 weeks:  99.0 %      We discussed that this is based on population data and each individual is different.  I discussed that she is certainly at increased risk for a periviable or early  delivery above the general population.       She was noted to be dilated FT cm and her cervix is <10 mm on TVUS--making her at significant risk for /periviable delivery.  We discussed that I recommend a cerclage in this setting.  I discussed risks of a cerclage including risk of bleeding, infection, damage to bowel/bladder, PPROM, fetal demise.  We discussed modified activity and pelvic rest.   She will do vaginal progesterone starting at her post op visit in a few weeks.       She will do vaginal progesterone until 37 weeks and we will do a 72 hour course of indocin and a 24 hour course of ancef that she can start in hospital.       We will also observe on toco to make sure she is not having contractions prior to going to the OR.       We discussed her options--cerclage and hope to prevent a periviable/previable delivery. I told her in no uncertain terms that when pts are dilated or significantly shortened, they are at risk for infection/ PPROM even after cerclage.  However, the likelihood of a periviable delivery if she does nothing is extremely high over the coming weeks.  She understands and desires to move forward with cerclage knowing the risks.      We will admit and plan for cerclage this evening.      She will stay for 24 hour of IV antibiotics and indocin after.       Summary of Plan  -To hospital for cerclage this evening.   -24 hours of q8 ancef and q8 indocin while inpatient.  -[place] x 30 min to 1 hour prior to procedure  -Meds to beds for  indocin/Z-pack, progesterone.  Pt has bowel regimen (colace) at home.    -Plan to DC tomorrow if pt doing well     Follow-up: 1-2 weeks post op.     Thank you for the consult and opportunity to care for this patient.  Please feel free to reach out with any questions or concerns.      I spent 65 minutes caring for this patient on this date of service. This time includes time spent by me in the following activities: preparing for the visit, reviewing tests, obtaining and/or reviewing a separately obtained history, performing a medically appropriate examination and/or evaluation, counseling and educating the patient/family/caregiver and independently interpreting results and communicating that information with the patient/family/caregiver with greater than 50% spent in counseling and coordination of care.       I spent 4 minutes on the separately reported service of US imaging not included in the time used to support the E/M service also reported today.      Haley Kirkland MD FACOG  Maternal Fetal Medicine-[facility]  Office: [phone]  [email or web address]

## 2024-12-07 VITALS
WEIGHT: 220 LBS | HEART RATE: 66 BPM | BODY MASS INDEX: 36.65 KG/M2 | OXYGEN SATURATION: 100 % | HEIGHT: 65 IN | DIASTOLIC BLOOD PRESSURE: 71 MMHG | TEMPERATURE: 98.1 F | RESPIRATION RATE: 16 BRPM | SYSTOLIC BLOOD PRESSURE: 113 MMHG

## 2024-12-07 PROCEDURE — 90656 IIV3 VACC NO PRSV 0.5 ML IM: CPT | Performed by: OBSTETRICS & GYNECOLOGY

## 2024-12-07 PROCEDURE — 99239 HOSP IP/OBS DSCHRG MGMT >30: CPT | Performed by: OBSTETRICS & GYNECOLOGY

## 2024-12-07 PROCEDURE — G0008 ADMIN INFLUENZA VIRUS VAC: HCPCS | Performed by: OBSTETRICS & GYNECOLOGY

## 2024-12-07 PROCEDURE — G0378 HOSPITAL OBSERVATION PER HR: HCPCS

## 2024-12-07 PROCEDURE — 25010000002 INFLUENZA VIRUS VACC SPLIT PF 0.5 ML SUSPENSION PREFILLED SYRINGE: Performed by: OBSTETRICS & GYNECOLOGY

## 2024-12-07 PROCEDURE — 25010000002 CEFAZOLIN PER 500 MG: Performed by: OBSTETRICS & GYNECOLOGY

## 2024-12-07 RX ADMIN — SODIUM CHLORIDE 2000 MG: 900 INJECTION INTRAVENOUS at 01:19

## 2024-12-07 RX ADMIN — INFLUENZA A VIRUS A/VICTORIA/4897/2022 IVR-238 (H1N1) ANTIGEN (FORMALDEHYDE INACTIVATED), INFLUENZA A VIRUS A/CALIFORNIA/122/2022 SAN-022 (H3N2) ANTIGEN (FORMALDEHYDE INACTIVATED), AND INFLUENZA B VIRUS B/MICHIGAN/01/2021 ANTIGEN (FORMALDEHYDE INACTIVATED) 0.5 ML: 15; 15; 15 INJECTION, SUSPENSION INTRAMUSCULAR at 12:08

## 2024-12-07 RX ADMIN — Medication 10 ML: at 08:44

## 2024-12-07 RX ADMIN — INDOMETHACIN 50 MG: 50 CAPSULE ORAL at 12:07

## 2024-12-07 RX ADMIN — SODIUM CHLORIDE 2000 MG: 900 INJECTION INTRAVENOUS at 08:43

## 2024-12-07 RX ADMIN — INDOMETHACIN 50 MG: 50 CAPSULE ORAL at 08:44

## 2024-12-07 NOTE — ANESTHESIA PREPROCEDURE EVALUATION
Anesthesia Evaluation     Patient summary reviewed and Nursing notes reviewed   no history of anesthetic complications:   NPO Solid Status: > 8 hours  NPO Liquid Status: > 8 hours           Airway   Mallampati: II  TM distance: >3 FB  Neck ROM: full  No difficulty expected  Dental - normal exam     Pulmonary    (-) asthma, sleep apnea, rhonchi, decreased breath sounds, wheezes, not a smoker  Cardiovascular   Exercise tolerance: good (4-7 METS)    Rhythm: regular  Rate: normal    (-) hypertension, CAD, dysrhythmias, angina, MARTINEZ, murmur      Neuro/Psych  (-) seizures, CVA  GI/Hepatic/Renal/Endo    (+) obesity  (-) liver disease, no renal disease, diabetes, no thyroid disorder    Musculoskeletal     Abdominal     Abdomen: soft.   Substance History      OB/GYN    (+) Pregnant        Other                    Anesthesia Plan    ASA 2     spinal     intravenous induction     Anesthetic plan, risks, benefits, and alternatives have been provided, discussed and informed consent has been obtained with: patient.    CODE STATUS:    Level Of Support Discussed With: Patient  Code Status (Patient has no pulse and is not breathing): CPR (Attempt to Resuscitate)  Medical Interventions (Patient has pulse or is breathing): Full Support

## 2024-12-07 NOTE — ANESTHESIA PROCEDURE NOTES
Spinal Block      Patient reassessed immediately prior to procedure    Patient location during procedure: OR  Start Time: 12/6/2024 7:57 PM  Stop Time: 12/6/2024 8:02 PM  Indication:post-op pain management and procedure for pain  Preanesthetic Checklist  Completed: patient identified, IV checked, site marked, risks and benefits discussed, surgical consent, monitors and equipment checked, pre-op evaluation and timeout performed  Spinal Block Prep:  Patient Position:sitting  Sterile Tech:cap, gloves, mask and sterile barriers  Prep:Chloraprep  Patient Monitoring:blood pressure monitoring, continuous pulse oximetry and EKG    Spinal Block Procedure  Approach:midline  Guidance:palpation technique  Location:L4-L5  Needle Type:Sweta  Needle Gauge:25 G  Placement of Spinal needle event:cerebrospinal fluid aspirated  Paresthesia: no  Fluid Appearance:clear  Medications: bupivacaine PF (MARCAINE) 0.75 % injection - Spinal   1.5 mL - 12/6/2024 8:02:00 PM   Post Assessment  Patient Tolerance:patient tolerated the procedure well with no apparent complications  Complications no

## 2024-12-07 NOTE — PROGRESS NOTES
"Continued Stay Note  [facility]     Patient Name: Merly Rauscher  MRN: [id number]  Today's Date: 12/7/2024    Admit Date: 12/6/2024    Plan: CSW will follow up with mother after delivery of infant. ROXANE Rangel   Discharge Plan       Row Name 12/07/24 0844       Plan    Plan CSW will follow up with mother after delivery of infant. ROXANE Rangel    Plan Comments Mother: Merly Varner, MRN [id number]. CSW consulted for \"history of marjuana use and alcohol use before finding out she was pregnant\". Of note, mother had a positive UDS for THC prenatally on 10/10. Mother is currently admitted to antepartum for cerclage. CSW will follow up with mother following delivery of infant. CSW will remain available for other psychosocial needs while mother is inpatient. ROXANE Rangel                   Discharge Codes    No documentation.                       RAGHU Sanchez    "

## 2024-12-07 NOTE — DISCHARGE SUMMARY
MATERNAL FETAL MEDICINE DISCHARGE SUMMARY    Admission date: 2024  Discharge date: 24    Referring Provider: No Known Provider    Admission diagnosis:  Short cervix affecting pregnancy [O26.879]    Patient Active Problem List   Diagnosis    Amenorrhea    Missed menses    Obesity (BMI 30-39.9)    Nausea and vomiting during pregnancy prior to 22 weeks gestation    Short cervix affecting pregnancy       Discharge diagnosis:  SHORT CERVIX S/P CERCLAGE    Consultants:  ANESTHESIA    Hospital course:  Patient is a 23 yo  @ 23 w5d with gradual asymptomatic cervical shortening (1.75 to 1.1 cm in OB office over 2 weeks and 5 mm 2 days later). She was found to be fingertip dilated and 70 percent effaced in the office.  After discussions about risk/benefit/alternatives, she agreed to exam-indicated cerclage and was taken to operating room.  She and baby tolerated cerclage placement well and she has had an uneventful post operative course and is stable to be discharged today.        Vitals:    24 0823   BP: 115/68   Pulse: 76   Resp: 16   Temp: 98.1 °F (36.7 °C)   SpO2: 100%       GENERAL:  Well-developed, well-nourished in no acute distress.   ABD:  Gravid  FHT's: 154 bpm  EXTREMITIES:  No clubbing, cyanosis or edema.  PSYCHIATRIC:  Normal affect and mood.      Discharge condition: stable  Discharge diet   Dietary Orders (From admission, onward)       Start     Ordered    24 0850  Diet: Regular/House; Fluid Consistency: Thin (IDDSI 0)  Diet Effective Now        References:    Diet Order Crosswalk   Question Answer Comment   Diets: Regular/House    Fluid Consistency: Thin (IDDSI 0)        24 0850                  Additional Instructions: Call with fevers, uncontrolled nausea/vomiting/pain.  Medications:      Discharge Medications        New Medications        Instructions Start Date   azithromycin 250 MG tablet  Commonly known as: Zithromax Z-Elton   Take 2 tablets (500 mg) on  Day 1,  followed  by 1 tablet (250 mg) once daily on Days 2 through 5.      indomethacin 50 MG capsule  Commonly known as: INDOCIN   50 mg, Oral, 4 Times Daily      Progesterone 200 MG suppository   1 suppository, Vaginal, Every Night at Bedtime             Continue These Medications        Instructions Start Date   docusate sodium 100 MG capsule  Commonly known as: Colace   100 mg, Oral, 2 Times Daily      ondansetron 4 MG tablet  Commonly known as: Zofran   4 mg, Oral, Every 4 Hours PRN      polyethylene glycol 17 GM/SCOOP powder  Commonly known as: MIRALAX   17 g, Oral, Daily      prenatal vitamin 27-0.8 27-0.8 MG tablet tablet   Daily      Unisom SleepTabs 25 MG tablet  Generic drug: doxylamine   25 mg, Oral, Nightly      vitamin B-6 25 MG tablet  Commonly known as: PYRIDOXINE   25 mg, Oral, Nightly             Disposition:   Follow up:   Future Appointments   Date Time Provider Department Center   2024  9:30 AM Dejuan Robert MD [facility]     ASSESSMENT/COUNSELIN y.o.   23w5d gestation (Estimated Date of Delivery: 3/31/25).       -Pregnancy  [ X ] stable  [   ] improving [  ] worsening    There are no diagnoses linked to this encounter.    Short cervix, barely measurable 5 mm  S/p exam indicated cerclage.    We reviewed modified activity and pelvic rest.   She will do vaginal progesterone starting at her post op visit in a few weeks.       She will do vaginal progesterone until 37 weeks.  In a few hours she will be s/p 24 hour of indocin and ancef--we will send her home with 48 hours more of indocin and z-pack.      Summary of Plan  -Discharge today  -Meds to beds for indocin/Z-pack, progesterone.  Pt has bowel regimen (colace) at home.       Follow-up: 1-2 weeks post op. Will call Monday    Thank you for the consult and opportunity to care for this patient.  Please feel free to reach out with any questions or concerns.      Over 30 minutes on discharge.  Counseling and coordinating care.    Haley ONTIVEROS  MD [facility]  Maternal Fetal Medicine-[facility]  Office: [phone]  [email or web address]

## 2024-12-07 NOTE — PLAN OF CARE
Goal Outcome Evaluation:  Plan of Care Reviewed With: patient        Progress: improving  Outcome Evaluation: VSS. No pain, cramping, or bleeding at this time. Pt able to ambulate, void. Meds recieved. Pt denies any needs or complaints at this time. Pt to be discharged.

## 2024-12-07 NOTE — OP NOTE
OPERATIVE NOTE     PRE-OP DIAGNOSIS: Gradual asymptomatic shortening of cervix from 1.75-1.1 cm in OB office over 2 weeks and found to be 5 mm today.  Pt fingertip dilated in office.      POST-OP DIAGNOSIS: same, s/p exam indicated cerclage.       PROCEDURE: LYLE CERVICAL CERCLAGE     Surgeon: ALBA JORGE MD     Anesthesia: SPINAL     EBL:  10 CC, MINIMAL        INDICATION: Patient is a 23 yo  @ 23 w4d with gradual asymptomatic cervical shortening over the last few weeks.  Cervix found to be 5 mm with large funnel and sludge today.     INFORMED CONSENT -   Risks, benefits, and alternatives to cervical cerclage discussed.   Risks of cerclage included but not limited to - pain, bleeding, damage to cervix, rupture of membranes, infection, need for removal of cerclage, failure of cerclage, damage to bowel/bladder. Benefits include advancing gestational age at time of delivery, or reduction in  birth with delivery at term. The alternative would be expectant management and likely previable/periviable delivery.       PROCEDURE:   Patient was taken to the OR after informed consent obtained and prepped and draped in the usual sterile fashion. Time out was performed and a spinal was placed without complication.  She  was placed in dorsal lithotomy position in boot stirrups.   Trujillo catheter was placed in a sterile fashion to drain bladder, removed, and vagina was irrigated with sterile betadine. Weighted speculum and herman retractor were placed and provided good visualization of the cervix.  The cervix appeared fingertip dilated, soft and shortened to <1 cm length.   Sponge stick clamps were used to grasp the cervix at 12 o'clock and 6 o'clock to provide traction for the procedure.      A 5mm mersilene tape was placed in a circumferential, purse string fashion without complication with good visualization of the bladder, vaginal side walls, and the rectum.  A stitch was placed from 12  o'clock to 9 o'clock, from 9 o'clock to 7 o'clock, from 7 o'clock to 4 o'clock, from 4 o'clock to 2 o'clock, and from 2 o'clock to 12 o'clock.   Cervical cerclage was tied down at 12 o'clock position and suture was cut with an approximate 1cm tail.   Cervix was noted to be hemostatic without evidence of rupture of membrane.    Rectal exam was negative for suture.  Patient was cleaned and taken back to the recovery room in stable condition.        Fetal heart tones were normal in recovery.  Fetus was active.   Membranes appeared intact on US. Patient and fetus tolerated procedure well.        Haley Kirkland MD FACOG  Maternal Fetal Medicine-[facility]  Office: [phone]  [email or web address]

## 2024-12-07 NOTE — PLAN OF CARE
Problem: Adult Inpatient Plan of Care  Goal: Plan of Care Review  Outcome: Progressing  Flowsheets (Taken 12/6/2024 2115)  Progress: improving  Outcome Evaluation: pt cervical cerclage placed @ approximately 2015 d/t shortening cervical length. see MAR for meds given prior. per MD, pt may eat as tolerated 1-2 hours post-op as long as the pt is not cramping or bleeding heavily. pt to recover 1-2 hours on labor and delivery and then to move upstairs to antepartum where she will recieve the rest of her abx schedule, equaling 24 hours. procedure went well and pt has no complaints of pain. anticipate full recovery with no complications.  Plan of Care Reviewed With: patient  Goal: Patient-Specific Goal (Individualized)  Outcome: Progressing  Goal: Absence of Hospital-Acquired Illness or Injury  Outcome: Progressing  Intervention: Identify and Manage Fall Risk  Recent Flowsheet Documentation  Taken 12/6/2024 1909 by Em Gonsalves RN  Safety Promotion/Fall Prevention: safety round/check completed  Intervention: Prevent and Manage VTE (Venous Thromboembolism) Risk  Recent Flowsheet Documentation  Taken 12/6/2024 2056 by Em Gonsalves RN  VTE Prevention/Management:   bilateral   SCDs (sequential compression devices) on  Taken 12/6/2024 1909 by Em Gonsalves RN  VTE Prevention/Management:   bilateral   SCDs (sequential compression devices) on  Goal: Optimal Comfort and Wellbeing  Outcome: Progressing  Intervention: Provide Person-Centered Care  Recent Flowsheet Documentation  Taken 12/6/2024 2056 by Em Gonsalves RN  Trust Relationship/Rapport:   care explained   choices provided   emotional support provided   empathic listening provided   questions answered   questions encouraged   reassurance provided   thoughts/feelings acknowledged  Taken 12/6/2024 1909 by Em Gonsalves RN  Trust Relationship/Rapport:   care explained   choices provided   emotional support provided   empathic listening provided   questions  answered   questions encouraged   reassurance provided   thoughts/feelings acknowledged  Goal: Readiness for Transition of Care  Outcome: Progressing     Problem: Pain Acute  Goal: Optimal Pain Control and Function  Outcome: Progressing  Intervention: Optimize Psychosocial Wellbeing  Recent Flowsheet Documentation  Taken 12/6/2024 2056 by Em Gonsalves RN  Diversional Activities: smartphone  Taken 12/6/2024 1909 by Em Gonsalves RN  Diversional Activities: smartphone     Problem: Cervical Cerclage  Goal: Absence of Bleeding  Outcome: Progressing  Goal: Effective Bowel Elimination  Outcome: Progressing  Goal: Optimal Patient and Fetal Wellbeing  Outcome: Progressing  Goal: Fluid and Electrolyte Balance  Outcome: Progressing  Goal: Absence of Infection Signs and Symptoms  Outcome: Progressing  Goal: Anesthesia/Sedation Recovery  Outcome: Progressing  Intervention: Optimize Anesthesia Recovery  Recent Flowsheet Documentation  Taken 12/6/2024 1909 by Em Gonsalves RN  Safety Promotion/Fall Prevention: safety round/check completed  Goal: Optimal Pain Control and Function  Outcome: Progressing  Intervention: Prevent or Manage Pain  Recent Flowsheet Documentation  Taken 12/6/2024 2056 by Em Gonsalves RN  Diversional Activities: smartphone  Taken 12/6/2024 1909 by Em Gonsalves RN  Diversional Activities: smartphone  Goal: Nausea and Vomiting Relief  Outcome: Progressing  Goal: Effective Urinary Elimination  Outcome: Progressing  Goal: Effective Oxygenation and Ventilation  Outcome: Progressing     Problem: Skin Injury Risk Increased  Goal: Skin Health and Integrity  Outcome: Progressing   Goal Outcome Evaluation:  Plan of Care Reviewed With: patient        Progress: improving  Outcome Evaluation: pt cervical cerclage placed @ approximately 2015 d/t shortening cervical length. see MAR for meds given prior. per MD, pt may eat as tolerated 1-2 hours post-op as long as the pt is not cramping or bleeding heavily. pt to  recover 1-2 hours on labor and delivery and then to move upstairs to antepartum where she will recieve the rest of her abx schedule, equaling 24 hours. procedure went well and pt has no complaints of pain. anticipate full recovery with no complications.

## 2024-12-07 NOTE — PLAN OF CARE
Problem: Adult Inpatient Plan of Care  Goal: Plan of Care Review  Outcome: Progressing  Goal: Patient-Specific Goal (Individualized)  Outcome: Progressing  Goal: Absence of Hospital-Acquired Illness or Injury  Outcome: Progressing  Intervention: Prevent Skin Injury  Recent Flowsheet Documentation  Taken 12/6/2024 1700 by Paula Latham RN  Body Position: position changed independently  Intervention: Prevent and Manage VTE (Venous Thromboembolism) Risk  Recent Flowsheet Documentation  Taken 12/6/2024 1749 by Paula Latham RN  VTE Prevention/Management:   bilateral   SCDs (sequential compression devices) on  Goal: Optimal Comfort and Wellbeing  Outcome: Progressing  Intervention: Provide Person-Centered Care  Recent Flowsheet Documentation  Taken 12/6/2024 1700 by Paula Latham RN  Trust Relationship/Rapport:   choices provided   care explained   emotional support provided   empathic listening provided   questions answered   questions encouraged   reassurance provided   thoughts/feelings acknowledged  Goal: Readiness for Transition of Care  Outcome: Progressing  Intervention: Mutually Develop Transition Plan  Recent Flowsheet Documentation  Taken 12/6/2024 1734 by Paula Latham RN  Equipment Currently Used at Home: none  Taken 12/6/2024 1731 by Paula Latham RN  Transportation Anticipated: car, drives self  Patient/Family Anticipated Services at Transition: none  Patient/Family Anticipates Transition to: home     Problem: Pain Acute  Goal: Optimal Pain Control and Function  Outcome: Progressing  Intervention: Optimize Psychosocial Wellbeing  Recent Flowsheet Documentation  Taken 12/6/2024 1700 by Paula Latham RN  Diversional Activities: smartphone     Problem: Cervical Cerclage  Goal: Absence of Bleeding  Outcome: Progressing  Goal: Effective Bowel Elimination  Outcome: Progressing  Goal: Optimal Patient and Fetal Wellbeing  Outcome: Progressing  Goal: Fluid and Electrolyte Balance  Outcome:  Progressing  Goal: Absence of Infection Signs and Symptoms  Outcome: Progressing  Goal: Anesthesia/Sedation Recovery  Outcome: Progressing  Goal: Optimal Pain Control and Function  Outcome: Progressing  Intervention: Prevent or Manage Pain  Recent Flowsheet Documentation  Taken 12/6/2024 1700 by Paula Latham, RN  Diversional Activities: smartphone  Goal: Nausea and Vomiting Relief  Outcome: Progressing  Goal: Effective Urinary Elimination  Outcome: Progressing  Goal: Effective Oxygenation and Ventilation  Outcome: Progressing  Intervention: Optimize Oxygenation and Ventilation  Recent Flowsheet Documentation  Taken 12/6/2024 1700 by Paula Latham, RN  Head of Bed (HOB) Positioning: HOB elevated   Goal Outcome Evaluation:   Pt presented to the unit from Dr. Kirkland's office related to shortened cervix of 5mm. Pt to have a cerclage today at 2030.

## 2024-12-08 NOTE — ANESTHESIA POSTPROCEDURE EVALUATION
"Patient: Merly LAM Varner    Procedure Summary       Date: 12/06/24 Room / Location:  [facility]    Anesthesia Start: 1949 Anesthesia Stop: 2054    Procedure: CERVICAL CERCLAGE (Cervix) Diagnosis:       Cervical insufficiency during pregnancy in second trimester, antepartum      (Cervical insufficiency during pregnancy in second trimester, antepartum [O34.32])    Surgeons: Haley Kirkland MD Provider: Te Pressley MD    Anesthesia Type: spinal ASA Status: 2            Anesthesia Type: spinal    Vitals  Vitals Value Taken Time   /71 12/07/24 1212   Temp 36.7 °C (98.1 °F) 12/07/24 0823   Pulse 66 12/07/24 1212   Resp 16 12/07/24 1212   SpO2 100 % 12/07/24 1212           Post Anesthesia Care and Evaluation    Level of consciousness: awake and alert  Pain management: adequate    Airway patency: patent  Anesthetic complications: No anesthetic complications  PONV Status: none  Cardiovascular status: acceptable  Respiratory status: acceptable  Hydration status: acceptable    Comments: /71   Pulse 66   Temp 36.7 °C (98.1 °F) (Oral)   Resp 16   Ht 165.1 cm (65\")   Wt 99.8 kg (220 lb)   LMP 06/24/2024 (Exact Date)   SpO2 100%   Breastfeeding Yes   BMI 36.61 kg/m²           "

## 2024-12-09 ENCOUNTER — TRANSCRIBE ORDERS (OUTPATIENT)
Dept: ULTRASOUND IMAGING | Facility: HOSPITAL | Age: 24
End: 2024-12-09
Payer: COMMERCIAL

## 2024-12-09 DIAGNOSIS — O26.879 SHORT CERVIX AFFECTING PREGNANCY: Primary | ICD-10-CM

## 2024-12-17 ENCOUNTER — HOSPITAL ENCOUNTER (OUTPATIENT)
Dept: ULTRASOUND IMAGING | Facility: HOSPITAL | Age: 24
Discharge: HOME | End: 2024-12-17
Admitting: OBSTETRICS & GYNECOLOGY
Payer: COMMERCIAL

## 2024-12-17 ENCOUNTER — OFFICE VISIT (OUTPATIENT)
Dept: OBSTETRICS AND GYNECOLOGY | Facility: CLINIC | Age: 24
End: 2024-12-17
Payer: COMMERCIAL

## 2024-12-17 VITALS
SYSTOLIC BLOOD PRESSURE: 114 MMHG | DIASTOLIC BLOOD PRESSURE: 59 MMHG | TEMPERATURE: 98 F | WEIGHT: 220.6 LBS | BODY MASS INDEX: 36.71 KG/M2 | HEART RATE: 91 BPM

## 2024-12-17 DIAGNOSIS — Z3A.25 25 WEEKS GESTATION OF PREGNANCY: ICD-10-CM

## 2024-12-17 DIAGNOSIS — O34.32 CERVICAL CERCLAGE SUTURE PRESENT IN SECOND TRIMESTER: Primary | ICD-10-CM

## 2024-12-17 DIAGNOSIS — O26.879 SHORT CERVIX AFFECTING PREGNANCY: ICD-10-CM

## 2024-12-17 PROCEDURE — 76815 OB US LIMITED FETUS(S): CPT

## 2024-12-17 PROCEDURE — 76817 TRANSVAGINAL US OBSTETRIC: CPT

## 2024-12-17 NOTE — LETTER
2024     Dejuan Robert MD  [address]  [address]  [place] [id number]    Patient: Merly Varner   YOB: 2000   Date of Visit: 2024       Dear Dejuan Robert MD    Merly Varner was in my office today. Below is a copy of my note.    If you have questions, please do not hesitate to call me. I look forward to following Merly along with you.         Sincerely,        Janki Salinas MD        CC: No Recipients    Pt reports that she is doing well and denies vaginal bleeding, cramping, contractions or LOF at this time. Reports active fetal movement. Reviewed when to call OB office or present to L&D for evaluation with symptoms such as decreased fetal movement, vaginal bleeding, LOF or ctxs. Pt verbalized understanding. Denies HA, visual changes or epigastric pain. Denies any additional complaints at time of appointment. Next OB appointment scheduled for .    Vitals:    24 1130   BP: 114/59   Pulse: 91   Temp: 98 °F (36.7 °C)          MATERNAL FETAL MEDICINE Consult Note    Dear Dr Francis ref. provider found:    Thank you for your kind referral of Merly Varner.  As you know, she is a 24 y.o.   25w1d gestation (Estimated Date of Delivery: 3/31/25). This is a consult.      Her antepartum course is complicated by:  Status post exam indicated cerclage     HPI: Today, she denies headache, blurry vision, RUQ pain. No vaginal bleeding, no contractions.   States that she has been feeling well following her cerclage placement.  Denies bleeding or significant cramping.    Review of History:  Past Medical History:   Diagnosis Date   • Chlamydia 2018    treated     Past Surgical History:   Procedure Laterality Date   • CERVICAL CERCLAGE N/A 2024    Procedure: CERVICAL CERCLAGE;  Surgeon: Haley Kirkland MD;  Location: [facility] LABOR DELIVERY;  Service: Obstetrics;  Laterality: N/A;   • WISDOM TOOTH EXTRACTION         Social History     Socioeconomic History   • Marital status:  Single   Tobacco Use   • Smoking status: Never     Passive exposure: Never   • Smokeless tobacco: Never   Vaping Use   • Vaping status: Never Used   Substance and Sexual Activity   • Alcohol use: Not Currently   • Drug use: Not Currently     Comment: history of marjuana use, stopped at 16 weeks 10/13/2024   • Sexual activity: Yes     Partners: Male     Family History   Problem Relation Age of Onset   • Cancer Other    • Breast cancer Other    • Colon cancer Neg Hx    • Ovarian cancer Neg Hx    • Uterine cancer Neg Hx       No Known Allergies   Current Outpatient Medications on File Prior to Visit   Medication Sig Dispense Refill   • docusate sodium (Colace) 100 MG capsule Take 1 capsule by mouth 2 (Two) Times a Day. 60 capsule 1   • doxylamine (Unisom SleepTabs) 25 MG tablet Take 1 tablet by mouth Every Night. 30 tablet 1   • ondansetron (Zofran) 4 MG tablet Take 1 tablet by mouth Every 4 (Four) Hours As Needed for Nausea or Vomiting. 30 tablet 1   • polyethylene glycol (MIRALAX) 17 GM/SCOOP powder Take 17 g by mouth Daily. 572 g 5   • Prenatal Vit-Fe Fumarate-FA (prenatal vitamin 27-0.8) 27-0.8 MG tablet tablet Take  by mouth Daily.     • Progesterone (PROMETRIUM) 200 MG capsule Insert 1 capsule into the vagina every night at bedtime. 90 capsule 5   • vitamin B-6 (PYRIDOXINE) 25 MG tablet Take 1 tablet by mouth Every Night. 30 tablet 1     No current facility-administered medications on file prior to visit.        Past obstetric, gynecological, medical, surgical, family and social history reviewed.  Relevant lab work and imaging reviewed.    Review of systems  As above in HPI    Vitals:    12/17/24 1130   BP: 114/59   BP Location: Right arm   Patient Position: Sitting   Pulse: 91   Temp: 98 °F (36.7 °C)   TempSrc: Temporal   Weight: 100 kg (220 lb 9.6 oz)       PHYSICAL EXAM   General: No acute distress  Respiratory: No increased work of breathing  Cardiac regular rate   abdominal: Gravid, nontender  Extremities:  No significant edema  Neuro/psych: Alert and oriented, appropriate mood        ULTRASOUND   Please view full ultrasound note on Imaging tab in ViewPoint.  Cephalic presentation  Anterior placenta  RAYMOND 11 cm which is normal  The cerclage appears intact. Cervical length is 1.5 cm. A funnel is noted to the level of the cerclage but not beyond.  The fetal anatomic survey is now complete and appears normal       ASSESSMENT/COUNSELIN y.o.   25w1d gestation (Estimated Date of Delivery: 3/31/25).       -Pregnancy  [ X ] stable  [   ] improving [  ] worsening    Diagnoses and all orders for this visit:    1. Cervical cerclage suture present in second trimester (Primary)    2. Short cervix affecting pregnancy    3. 25 weeks gestation of pregnancy        Short cervix, barely measurable 5 mm  S/p exam indicated cerclage placement  Compliant with vaginal progesterone  Patient aware of recommendations for no heavy lifting and continued pelvic rest.    We reviewed that she has funneling to the level of the cerclage which is to be expected.  Discussed that this indicates that this procedure was necessary.  Discussed that Shakeel intact and there is approximately 1.5 cm of cervix distal to the cerclage.    We discussed the recommendations for removal of cerclage at 36 weeks.  She was counseled that not all patients will immediately labor.  Discussed that some patients to ultimately end up requiring induction of labor.  She expressed understanding.         Summary of Plan  -Routine OB follow-up  -Continue routine prenatal care  -Removal of cerclage at 36 weeks or with evidence of  labor  -Delivery at 39 weeks     Follow-up: Further MFM follow-up.  Happy to see patient again at provider or patient request.    Thank you for the consult and opportunity to care for this patient.  Please feel free to reach out with any questions or concerns.      I spent 15 minutes caring for this patient on this date of service. This  time includes time spent by me in the following activities: preparing for the visit, reviewing tests, obtaining and/or reviewing a separately obtained history, performing a medically appropriate examination and/or evaluation, counseling and educating the patient/family/caregiver and independently interpreting results and communicating that information with the patient/family/caregiver with greater than 50% spent in counseling and coordination of care.         I spent 3 minutes on the separately reported service of US imaging not included in the time used to support the E/M service also reported today.      Janki Salinas MD   Maternal Fetal Medicine-[facility]  Office: [phone]

## 2024-12-17 NOTE — PROGRESS NOTES
Pt reports that she is doing well and denies vaginal bleeding, cramping, contractions or LOF at this time. Reports active fetal movement. Reviewed when to call OB office or present to L&D for evaluation with symptoms such as decreased fetal movement, vaginal bleeding, LOF or ctxs. Pt verbalized understanding. Denies HA, visual changes or epigastric pain. Denies any additional complaints at time of appointment. Next OB appointment scheduled for 12/30.    Vitals:    12/17/24 1130   BP: 114/59   Pulse: 91   Temp: 98 °F (36.7 °C)

## 2024-12-17 NOTE — PROGRESS NOTES
MATERNAL FETAL MEDICINE Consult Note    Dear Dr Francis ref. provider found:    Thank you for your kind referral of Merly LAM Varner.  As you know, she is a 24 y.o.   25w1d gestation (Estimated Date of Delivery: 3/31/25). This is a consult.      Her antepartum course is complicated by:  Status post exam indicated cerclage     HPI: Today, she denies headache, blurry vision, RUQ pain. No vaginal bleeding, no contractions.   States that she has been feeling well following her cerclage placement.  Denies bleeding or significant cramping.    Review of History:  Past Medical History:   Diagnosis Date    Chlamydia 2018    treated     Past Surgical History:   Procedure Laterality Date    CERVICAL CERCLAGE N/A 2024    Procedure: CERVICAL CERCLAGE;  Surgeon: Haley Kirkland MD;  Location: [facility] LABOR DELIVERY;  Service: Obstetrics;  Laterality: N/A;    WISDOM TOOTH EXTRACTION         Social History     Socioeconomic History    Marital status: Single   Tobacco Use    Smoking status: Never     Passive exposure: Never    Smokeless tobacco: Never   Vaping Use    Vaping status: Never Used   Substance and Sexual Activity    Alcohol use: Not Currently    Drug use: Not Currently     Comment: history of marjuana use, stopped at 16 weeks 10/13/2024    Sexual activity: Yes     Partners: Male     Family History   Problem Relation Age of Onset    Cancer Other     Breast cancer Other     Colon cancer Neg Hx     Ovarian cancer Neg Hx     Uterine cancer Neg Hx       No Known Allergies   Current Outpatient Medications on File Prior to Visit   Medication Sig Dispense Refill    docusate sodium (Colace) 100 MG capsule Take 1 capsule by mouth 2 (Two) Times a Day. 60 capsule 1    doxylamine (Unisom SleepTabs) 25 MG tablet Take 1 tablet by mouth Every Night. 30 tablet 1    ondansetron (Zofran) 4 MG tablet Take 1 tablet by mouth Every 4 (Four) Hours As Needed for Nausea or Vomiting. 30 tablet 1    polyethylene glycol (MIRALAX) 17 GM/SCOOP  powder Take 17 g by mouth Daily. 572 g 5    Prenatal Vit-Fe Fumarate-FA (prenatal vitamin 27-0.8) 27-0.8 MG tablet tablet Take  by mouth Daily.      Progesterone (PROMETRIUM) 200 MG capsule Insert 1 capsule into the vagina every night at bedtime. 90 capsule 5    vitamin B-6 (PYRIDOXINE) 25 MG tablet Take 1 tablet by mouth Every Night. 30 tablet 1     No current facility-administered medications on file prior to visit.        Past obstetric, gynecological, medical, surgical, family and social history reviewed.  Relevant lab work and imaging reviewed.    Review of systems  As above in HPI    Vitals:    24 1130   BP: 114/59   BP Location: Right arm   Patient Position: Sitting   Pulse: 91   Temp: 98 °F (36.7 °C)   TempSrc: Temporal   Weight: 100 kg (220 lb 9.6 oz)       PHYSICAL EXAM   General: No acute distress  Respiratory: No increased work of breathing  Cardiac regular rate   abdominal: Gravid, nontender  Extremities: No significant edema  Neuro/psych: Alert and oriented, appropriate mood        ULTRASOUND   Please view full ultrasound note on Imaging tab in ViewPoint.  Cephalic presentation  Anterior placenta  RAYMOND 11 cm which is normal  The cerclage appears intact. Cervical length is 1.5 cm. A funnel is noted to the level of the cerclage but not beyond.  The fetal anatomic survey is now complete and appears normal       ASSESSMENT/COUNSELIN y.o.   25w1d gestation (Estimated Date of Delivery: 3/31/25).       -Pregnancy  [ X ] stable  [   ] improving [  ] worsening    Diagnoses and all orders for this visit:    1. Cervical cerclage suture present in second trimester (Primary)    2. Short cervix affecting pregnancy    3. 25 weeks gestation of pregnancy        Short cervix, barely measurable 5 mm  S/p exam indicated cerclage placement  Compliant with vaginal progesterone  Patient aware of recommendations for no heavy lifting and continued pelvic rest.    We reviewed that she has funneling to the  level of the cerclage which is to be expected.  Discussed that this indicates that this procedure was necessary.  Discussed that Shakeel intact and there is approximately 1.5 cm of cervix distal to the cerclage.    We discussed the recommendations for removal of cerclage at 36 weeks.  She was counseled that not all patients will immediately labor.  Discussed that some patients to ultimately end up requiring induction of labor.  She expressed understanding.         Summary of Plan  -Routine OB follow-up  -Continue routine prenatal care  -Removal of cerclage at 36 weeks or with evidence of  labor  -Delivery at 39 weeks     Follow-up: Further MFM follow-up.  Happy to see patient again at provider or patient request.    Thank you for the consult and opportunity to care for this patient.  Please feel free to reach out with any questions or concerns.      I spent 15 minutes caring for this patient on this date of service. This time includes time spent by me in the following activities: preparing for the visit, reviewing tests, obtaining and/or reviewing a separately obtained history, performing a medically appropriate examination and/or evaluation, counseling and educating the patient/family/caregiver and independently interpreting results and communicating that information with the patient/family/caregiver with greater than 50% spent in counseling and coordination of care.         I spent 3 minutes on the separately reported service of US imaging not included in the time used to support the E/M service also reported today.      Janki Salinas MD   Maternal Fetal Medicine-[facility]  Office: [phone]

## 2024-12-30 ENCOUNTER — ROUTINE PRENATAL (OUTPATIENT)
Dept: OBSTETRICS AND GYNECOLOGY | Facility: CLINIC | Age: 24
End: 2024-12-30
Payer: COMMERCIAL

## 2024-12-30 VITALS — WEIGHT: 232.4 LBS | DIASTOLIC BLOOD PRESSURE: 79 MMHG | BODY MASS INDEX: 38.67 KG/M2 | SYSTOLIC BLOOD PRESSURE: 137 MMHG

## 2024-12-30 DIAGNOSIS — Z34.82 PRENATAL CARE, SUBSEQUENT PREGNANCY IN SECOND TRIMESTER: ICD-10-CM

## 2024-12-30 DIAGNOSIS — O26.879 SHORT CERVIX AFFECTING PREGNANCY: ICD-10-CM

## 2024-12-30 DIAGNOSIS — Z3A.27 27 WEEKS GESTATION OF PREGNANCY: Primary | ICD-10-CM

## 2024-12-30 DIAGNOSIS — O34.32 CERVICAL CERCLAGE SUTURE PRESENT IN SECOND TRIMESTER: ICD-10-CM

## 2024-12-30 LAB
GLUCOSE UR STRIP-MCNC: NEGATIVE MG/DL
PROT UR STRIP-MCNC: NEGATIVE MG/DL

## 2024-12-30 PROCEDURE — 0502F SUBSEQUENT PRENATAL CARE: CPT | Performed by: OBSTETRICS & GYNECOLOGY

## 2024-12-30 NOTE — PROGRESS NOTES
OB follow up     Merly Varner is a 24 y.o.  27w0d being seen today for her obstetrical visit.  Patient reports no complaints. Fetal movement: normal.    Her prenatal care is complicated by (and status): Short cervix status post cerclage placement at 24 weeks    Review of Systems  Cramping/contractions : Negative  Vaginal bleeding: Negative  Fetal movement normal    /79   Wt 105 kg (232 lb 6.4 oz)   LMP 2024 (Exact Date)   BMI 38.67 kg/m²     FHT: 148 BPM   Uterine Size: size equals dates       Assessment    Diagnoses and all orders for this visit:    1. 27 weeks gestation of pregnancy (Primary)  -     POC Urinalysis Dipstick  -     Gestational Screen 1 Hr ([facility]); Future  -     Hemoglobin & Hematocrit, Blood; Future  -     Treponema pallidum AB w/Reflex RPR; Future    2. Prenatal care, subsequent pregnancy in second trimester    3. Short cervix affecting pregnancy    4. Cervical cerclage suture present in second trimester        1) pregnancy at 27w0d         Plan    Reviewed this stage of pregnancy  Problem list updated   Follow up in 4 weeks for growth ultrasound.  1 hour glucose next week.    Dejuan Robert MD   2024  14:35 EST

## 2025-01-08 ENCOUNTER — LAB (OUTPATIENT)
Dept: OBSTETRICS AND GYNECOLOGY | Facility: CLINIC | Age: 25
End: 2025-01-08
Payer: COMMERCIAL

## 2025-01-08 DIAGNOSIS — Z3A.27 27 WEEKS GESTATION OF PREGNANCY: ICD-10-CM

## 2025-01-09 LAB
GLUCOSE 1H P 50 G GLC PO SERPL-MCNC: 116 MG/DL (ref 70–139)
HCT VFR BLD AUTO: 37.5 % (ref 34–46.6)
HGB BLD-MCNC: 12.1 G/DL (ref 11.1–15.9)

## 2025-01-11 LAB — TREPONEMA PALLIDUM IGG+IGM AB [PRESENCE] IN SERUM OR PLASMA BY IMMUNOASSAY: NON REACTIVE

## 2025-01-28 ENCOUNTER — ROUTINE PRENATAL (OUTPATIENT)
Dept: OBSTETRICS AND GYNECOLOGY | Facility: CLINIC | Age: 25
End: 2025-01-28
Payer: COMMERCIAL

## 2025-01-28 VITALS — SYSTOLIC BLOOD PRESSURE: 139 MMHG | WEIGHT: 230.2 LBS | DIASTOLIC BLOOD PRESSURE: 83 MMHG | BODY MASS INDEX: 38.31 KG/M2

## 2025-01-28 DIAGNOSIS — O26.879 SHORT CERVIX AFFECTING PREGNANCY: ICD-10-CM

## 2025-01-28 DIAGNOSIS — Z01.419 ENCOUNTER FOR GYNECOLOGICAL EXAMINATION WITHOUT ABNORMAL FINDING: ICD-10-CM

## 2025-01-28 DIAGNOSIS — Z3A.31 31 WEEKS GESTATION OF PREGNANCY: Primary | ICD-10-CM

## 2025-01-28 LAB
GLUCOSE UR STRIP-MCNC: NEGATIVE MG/DL
PROT UR STRIP-MCNC: ABNORMAL MG/DL

## 2025-01-28 NOTE — PROGRESS NOTES
OB follow up     Merly Rauscher is a 24 y.o.  31w1d being seen today for her obstetrical visit.  Patient reports no complaints. Fetal movement: normal.  Comparison is previous obstetric ultrasound. Today study shows cephalic presentation with normal amniotic fluid volume and anterior placenta with no evidence of placenta previa. Fetal heart rate is 129 bpm. Fetal growth is at 46 percentile with abdominal circumference at 23rd percentile for 31 weeks 1 day.   Her prenatal care is complicated by (and status): Short cervix status post cerclage    Review of Systems  Cramping/contractions : Negative  Vaginal bleeding: Negative  Fetal movement normal    /83   Wt 104 kg (230 lb 3.2 oz)   LMP 2024 (Exact Date)   BMI 38.31 kg/m²     FHT: 129 BPM   Uterine Size: size equals dates       Assessment    Diagnoses and all orders for this visit:    1. 31 weeks gestation of pregnancy (Primary)  -     POC Urinalysis Dipstick    2. Encounter for gynecological examination without abnormal finding    3. Short cervix affecting pregnancy        1) pregnancy at 31w1d         Plan    Reviewed this stage of pregnancy  Problem list updated   Follow up in 3 weeks.  Tdap and RSV vaccines both recommended and offered.  Patient declines both.    Dejuan Robert MD   2025  09:35 EST

## 2025-02-18 ENCOUNTER — ROUTINE PRENATAL (OUTPATIENT)
Dept: OBSTETRICS AND GYNECOLOGY | Facility: CLINIC | Age: 25
End: 2025-02-18
Payer: COMMERCIAL

## 2025-02-18 VITALS — WEIGHT: 237.2 LBS | SYSTOLIC BLOOD PRESSURE: 135 MMHG | DIASTOLIC BLOOD PRESSURE: 85 MMHG | BODY MASS INDEX: 39.47 KG/M2

## 2025-02-18 DIAGNOSIS — Z3A.34 34 WEEKS GESTATION OF PREGNANCY: Primary | ICD-10-CM

## 2025-02-18 DIAGNOSIS — O34.33 CERVICAL CERCLAGE SUTURE PRESENT IN THIRD TRIMESTER: ICD-10-CM

## 2025-02-18 DIAGNOSIS — Z34.83 PRENATAL CARE, SUBSEQUENT PREGNANCY IN THIRD TRIMESTER: ICD-10-CM

## 2025-02-18 LAB
GLUCOSE UR STRIP-MCNC: NEGATIVE MG/DL
PROT UR STRIP-MCNC: ABNORMAL MG/DL

## 2025-02-18 NOTE — PROGRESS NOTES
OB follow up     Merly Rauscher is a 24 y.o.  34w1d being seen today for her obstetrical visit.  Patient reports no complaints. Fetal movement: normal.    Her prenatal care is complicated by (and status): Short cervix status post cervical cerclage    Review of Systems  Cramping/contractions : Negative  Vaginal bleeding: Negative  Fetal movement is normal    /85   Wt 108 kg (237 lb 3.2 oz)   LMP 2024 (Exact Date)   BMI 39.47 kg/m²     FHT: 148 BPM   Uterine Size: size equals dates       Assessment    Diagnoses and all orders for this visit:    1. 34 weeks gestation of pregnancy (Primary)  -     POC Urinalysis Dipstick    2. Prenatal care, subsequent pregnancy in third trimester    3. Cervical cerclage suture present in third trimester        1) pregnancy at 34w1d         Plan    Reviewed this stage of pregnancy  Problem list updated   Follow up in 2 weeks.  We will do her group B strep screen and I will do a speculum exam to assess stability cerclage stitch.  We will then determine whether the stitch could be removed here in the office or in labor and delivery with some IV sedation.    Dejuan Robert MD   2025  15:49 EST

## 2025-03-05 ENCOUNTER — ROUTINE PRENATAL (OUTPATIENT)
Dept: OBSTETRICS AND GYNECOLOGY | Facility: CLINIC | Age: 25
End: 2025-03-05
Payer: COMMERCIAL

## 2025-03-05 VITALS — WEIGHT: 236.8 LBS | BODY MASS INDEX: 39.41 KG/M2 | DIASTOLIC BLOOD PRESSURE: 83 MMHG | SYSTOLIC BLOOD PRESSURE: 133 MMHG

## 2025-03-05 DIAGNOSIS — O34.33 CERVICAL CERCLAGE SUTURE PRESENT IN THIRD TRIMESTER: ICD-10-CM

## 2025-03-05 DIAGNOSIS — Z3A.36 36 WEEKS GESTATION OF PREGNANCY: Primary | ICD-10-CM

## 2025-03-05 DIAGNOSIS — Z34.83 PRENATAL CARE, SUBSEQUENT PREGNANCY IN THIRD TRIMESTER: ICD-10-CM

## 2025-03-05 LAB
GLUCOSE UR STRIP-MCNC: NEGATIVE MG/DL
PROT UR STRIP-MCNC: NEGATIVE MG/DL

## 2025-03-05 NOTE — PROGRESS NOTES
OB follow up     Merly LAM Varner is a 24 y.o.  36w2d being seen today for her obstetrical visit.  Patient reports no complaints. Fetal movement: normal.    Her prenatal care is complicated by (and status): Cervical cerclage in place    Review of Systems  Cramping/contractions : Negative  Vaginal bleeding: Negative  Fetal movement is normal    /83   Wt 107 kg (236 lb 12.8 oz)   LMP 2024 (Exact Date)   BMI 39.41 kg/m²     FHT: 156 BPM   Uterine Size: size equals dates       Assessment    Diagnoses and all orders for this visit:    1. 36 weeks gestation of pregnancy (Primary)  -     POC Urinalysis Dipstick  -     Strep B Screen - Swab, Vaginal/Rectum    2. Prenatal care, subsequent pregnancy in third trimester    3. Cervical cerclage suture present in third trimester        1) pregnancy at 36w2d         Plan    Reviewed this stage of pregnancy  Problem list updated   Follow up in 1 week.  Group B strep screen done today.  We will plan to remove her cerclage stitch in 1 week here in the office.    Dejuan Robert MD   3/5/2025  10:12 EST

## 2025-03-07 LAB — GP B STREP DNA SPEC QL NAA+PROBE: NEGATIVE

## 2025-03-12 ENCOUNTER — ROUTINE PRENATAL (OUTPATIENT)
Dept: OBSTETRICS AND GYNECOLOGY | Facility: CLINIC | Age: 25
End: 2025-03-12
Payer: COMMERCIAL

## 2025-03-12 VITALS — DIASTOLIC BLOOD PRESSURE: 83 MMHG | BODY MASS INDEX: 39.77 KG/M2 | WEIGHT: 239 LBS | SYSTOLIC BLOOD PRESSURE: 122 MMHG

## 2025-03-12 DIAGNOSIS — Z34.83 PRENATAL CARE, SUBSEQUENT PREGNANCY IN THIRD TRIMESTER: ICD-10-CM

## 2025-03-12 DIAGNOSIS — Z3A.37 37 WEEKS GESTATION OF PREGNANCY: Primary | ICD-10-CM

## 2025-03-12 DIAGNOSIS — O34.33 CERVICAL CERCLAGE SUTURE PRESENT IN THIRD TRIMESTER: ICD-10-CM

## 2025-03-12 NOTE — PROGRESS NOTES
OB follow up     Merly Varner is a 24 y.o.  37w2d being seen today for her obstetrical visit.  Patient reports no complaints. Fetal movement: normal.    Her prenatal care is complicated by (and status): Short cervix with cerclage in place    Review of Systems  Cramping/contractions : Negative  Vaginal bleeding: Negative  Fetal movement is normal    /83   Wt 108 kg (239 lb)   LMP 2024 (Exact Date)   BMI 39.77 kg/m²     FHT: 142 BPM   Uterine Size: size equals dates       Assessment    Diagnoses and all orders for this visit:    1. 37 weeks gestation of pregnancy (Primary)  -     POC Urinalysis Dipstick    2. Prenatal care, subsequent pregnancy in third trimester    3. Cervical cerclage suture present in third trimester    Patient tolerated speculum exam very well.  The strand of the Mersilene band had been tied at 12:00 is lifted and sutures are used to cut the Mersilene band and the entire cerclage stitch is removed without difficulty.  Patient tolerated this very well and had minimal bleeding at the cerclage site that was controlled with silver nitrate.    1) pregnancy at 37w2d         Plan    Reviewed this stage of pregnancy  Problem list updated   Follow up in 1 week    Dejuan Robert MD   3/12/2025  11:17 EDT

## 2025-03-14 ENCOUNTER — ANESTHESIA (OUTPATIENT)
Dept: LABOR AND DELIVERY | Facility: HOSPITAL | Age: 25
End: 2025-03-14
Payer: COMMERCIAL

## 2025-03-14 ENCOUNTER — HOSPITAL ENCOUNTER (INPATIENT)
Facility: HOSPITAL | Age: 25
LOS: 3 days | Discharge: HOME | End: 2025-03-17
Attending: OBSTETRICS & GYNECOLOGY | Admitting: OBSTETRICS & GYNECOLOGY
Payer: COMMERCIAL

## 2025-03-14 ENCOUNTER — ANESTHESIA EVENT (OUTPATIENT)
Dept: LABOR AND DELIVERY | Facility: HOSPITAL | Age: 25
End: 2025-03-14
Payer: COMMERCIAL

## 2025-03-14 PROBLEM — Z34.90 PREGNANCY: Status: ACTIVE | Noted: 2025-03-14

## 2025-03-14 LAB
A1 MICROGLOB PLACENTAL VAG QL: NEGATIVE
ABO GROUP BLD: NORMAL
ALBUMIN SERPL-MCNC: 3.4 G/DL (ref 3.5–5.2)
ALBUMIN/GLOB SERPL: 1.1 G/DL
ALP SERPL-CCNC: 183 U/L (ref 39–117)
ALT SERPL W P-5'-P-CCNC: 10 U/L (ref 1–33)
ANION GAP SERPL CALCULATED.3IONS-SCNC: 8.8 MMOL/L (ref 5–15)
AST SERPL-CCNC: 20 U/L (ref 1–32)
BACTERIA UR QL AUTO: ABNORMAL /HPF
BASOPHILS # BLD AUTO: 0.03 10*3/MM3 (ref 0–0.2)
BASOPHILS NFR BLD AUTO: 0.3 % (ref 0–1.5)
BILIRUB SERPL-MCNC: 0.2 MG/DL (ref 0–1.2)
BILIRUB UR QL STRIP: NEGATIVE
BLD GP AB SCN SERPL QL: NEGATIVE
BUN SERPL-MCNC: 5 MG/DL (ref 6–20)
BUN/CREAT SERPL: 7.4 (ref 7–25)
CALCIUM SPEC-SCNC: 8.8 MG/DL (ref 8.6–10.5)
CHLORIDE SERPL-SCNC: 105 MMOL/L (ref 98–107)
CLARITY UR: CLEAR
CO2 SERPL-SCNC: 23.2 MMOL/L (ref 22–29)
COLOR UR: YELLOW
CREAT SERPL-MCNC: 0.68 MG/DL (ref 0.57–1)
DEPRECATED RDW RBC AUTO: 40.7 FL (ref 37–54)
EGFRCR SERPLBLD CKD-EPI 2021: 125.7 ML/MIN/1.73
EOSINOPHIL # BLD AUTO: 0.04 10*3/MM3 (ref 0–0.4)
EOSINOPHIL NFR BLD AUTO: 0.4 % (ref 0.3–6.2)
ERYTHROCYTE [DISTWIDTH] IN BLOOD BY AUTOMATED COUNT: 12.7 % (ref 12.3–15.4)
GLOBULIN UR ELPH-MCNC: 3 GM/DL
GLUCOSE SERPL-MCNC: 88 MG/DL (ref 65–99)
GLUCOSE UR STRIP-MCNC: NEGATIVE MG/DL
HCT VFR BLD AUTO: 36.8 % (ref 34–46.6)
HGB BLD-MCNC: 12.1 G/DL (ref 12–15.9)
HGB UR QL STRIP.AUTO: NEGATIVE
HYALINE CASTS UR QL AUTO: ABNORMAL /LPF
IMM GRANULOCYTES # BLD AUTO: 0.08 10*3/MM3 (ref 0–0.05)
IMM GRANULOCYTES NFR BLD AUTO: 0.8 % (ref 0–0.5)
KETONES UR QL STRIP: ABNORMAL
LEUKOCYTE ESTERASE UR QL STRIP.AUTO: ABNORMAL
LYMPHOCYTES # BLD AUTO: 2.18 10*3/MM3 (ref 0.7–3.1)
LYMPHOCYTES NFR BLD AUTO: 20.9 % (ref 19.6–45.3)
MCH RBC QN AUTO: 28.7 PG (ref 26.6–33)
MCHC RBC AUTO-ENTMCNC: 32.9 G/DL (ref 31.5–35.7)
MCV RBC AUTO: 87.4 FL (ref 79–97)
MONOCYTES # BLD AUTO: 0.69 10*3/MM3 (ref 0.1–0.9)
MONOCYTES NFR BLD AUTO: 6.6 % (ref 5–12)
MUCOUS THREADS URNS QL MICRO: ABNORMAL /HPF
NEUTROPHILS NFR BLD AUTO: 7.42 10*3/MM3 (ref 1.7–7)
NEUTROPHILS NFR BLD AUTO: 71 % (ref 42.7–76)
NITRITE UR QL STRIP: NEGATIVE
NRBC BLD AUTO-RTO: 0 /100 WBC (ref 0–0.2)
PH UR STRIP.AUTO: 6 [PH] (ref 5–8)
PLATELET # BLD AUTO: 194 10*3/MM3 (ref 140–450)
PMV BLD AUTO: 10.3 FL (ref 6–12)
POTASSIUM SERPL-SCNC: 4 MMOL/L (ref 3.5–5.2)
PROT SERPL-MCNC: 6.4 G/DL (ref 6–8.5)
PROT UR QL STRIP: ABNORMAL
RBC # BLD AUTO: 4.21 10*6/MM3 (ref 3.77–5.28)
RBC # UR STRIP: ABNORMAL /HPF
REF LAB TEST METHOD: ABNORMAL
RH BLD: POSITIVE
SODIUM SERPL-SCNC: 137 MMOL/L (ref 136–145)
SP GR UR STRIP: 1.03 (ref 1–1.03)
SQUAMOUS #/AREA URNS HPF: ABNORMAL /HPF
T&S EXPIRATION DATE: NORMAL
TREPONEMA PALLIDUM IGG+IGM AB [PRESENCE] IN SERUM OR PLASMA BY IMMUNOASSAY: NORMAL
UROBILINOGEN UR QL STRIP: ABNORMAL
WBC # UR STRIP: ABNORMAL /HPF
WBC NRBC COR # BLD AUTO: 10.44 10*3/MM3 (ref 3.4–10.8)

## 2025-03-14 PROCEDURE — 87086 URINE CULTURE/COLONY COUNT: CPT | Performed by: OBSTETRICS & GYNECOLOGY

## 2025-03-14 PROCEDURE — 59025 FETAL NON-STRESS TEST: CPT

## 2025-03-14 PROCEDURE — S0260 H&P FOR SURGERY: HCPCS | Performed by: OBSTETRICS & GYNECOLOGY

## 2025-03-14 PROCEDURE — 25810000003 LACTATED RINGERS PER 1000 ML: Performed by: OBSTETRICS & GYNECOLOGY

## 2025-03-14 PROCEDURE — 86901 BLOOD TYPING SEROLOGIC RH(D): CPT | Performed by: OBSTETRICS & GYNECOLOGY

## 2025-03-14 PROCEDURE — 80053 COMPREHEN METABOLIC PANEL: CPT | Performed by: OBSTETRICS & GYNECOLOGY

## 2025-03-14 PROCEDURE — 86900 BLOOD TYPING SEROLOGIC ABO: CPT | Performed by: OBSTETRICS & GYNECOLOGY

## 2025-03-14 PROCEDURE — 86850 RBC ANTIBODY SCREEN: CPT | Performed by: OBSTETRICS & GYNECOLOGY

## 2025-03-14 PROCEDURE — 99202 OFFICE O/P NEW SF 15 MIN: CPT | Performed by: OBSTETRICS & GYNECOLOGY

## 2025-03-14 PROCEDURE — 85025 COMPLETE CBC W/AUTO DIFF WBC: CPT | Performed by: OBSTETRICS & GYNECOLOGY

## 2025-03-14 PROCEDURE — 84112 EVAL AMNIOTIC FLUID PROTEIN: CPT | Performed by: OBSTETRICS & GYNECOLOGY

## 2025-03-14 PROCEDURE — 81001 URINALYSIS AUTO W/SCOPE: CPT | Performed by: OBSTETRICS & GYNECOLOGY

## 2025-03-14 PROCEDURE — 86780 TREPONEMA PALLIDUM: CPT | Performed by: OBSTETRICS & GYNECOLOGY

## 2025-03-14 RX ORDER — DIPHENHYDRAMINE HYDROCHLORIDE 50 MG/ML
12.5 INJECTION, SOLUTION INTRAMUSCULAR; INTRAVENOUS EVERY 8 HOURS PRN
Status: DISCONTINUED | OUTPATIENT
Start: 2025-03-14 | End: 2025-03-15 | Stop reason: HOSPADM

## 2025-03-14 RX ORDER — ONDANSETRON 4 MG/1
4 TABLET, ORALLY DISINTEGRATING ORAL EVERY 6 HOURS PRN
Status: DISCONTINUED | OUTPATIENT
Start: 2025-03-14 | End: 2025-03-15 | Stop reason: HOSPADM

## 2025-03-14 RX ORDER — SODIUM CHLORIDE, SODIUM LACTATE, POTASSIUM CHLORIDE, CALCIUM CHLORIDE 600; 310; 30; 20 MG/100ML; MG/100ML; MG/100ML; MG/100ML
125 INJECTION, SOLUTION INTRAVENOUS CONTINUOUS
Status: ACTIVE | OUTPATIENT
Start: 2025-03-14 | End: 2025-03-16

## 2025-03-14 RX ORDER — BUTORPHANOL TARTRATE 2 MG/ML
2 INJECTION, SOLUTION INTRAMUSCULAR; INTRAVENOUS
Status: DISCONTINUED | OUTPATIENT
Start: 2025-03-14 | End: 2025-03-15 | Stop reason: HOSPADM

## 2025-03-14 RX ORDER — ONDANSETRON 2 MG/ML
4 INJECTION INTRAMUSCULAR; INTRAVENOUS EVERY 6 HOURS PRN
Status: DISCONTINUED | OUTPATIENT
Start: 2025-03-14 | End: 2025-03-15 | Stop reason: HOSPADM

## 2025-03-14 RX ORDER — FAMOTIDINE 10 MG/ML
20 INJECTION, SOLUTION INTRAVENOUS 2 TIMES DAILY PRN
Status: DISCONTINUED | OUTPATIENT
Start: 2025-03-14 | End: 2025-03-15 | Stop reason: HOSPADM

## 2025-03-14 RX ORDER — FAMOTIDINE 20 MG/1
20 TABLET, FILM COATED ORAL 2 TIMES DAILY PRN
Status: DISCONTINUED | OUTPATIENT
Start: 2025-03-14 | End: 2025-03-15 | Stop reason: HOSPADM

## 2025-03-14 RX ORDER — EPHEDRINE SULFATE 50 MG/ML
5 INJECTION, SOLUTION INTRAVENOUS AS NEEDED
Status: DISCONTINUED | OUTPATIENT
Start: 2025-03-14 | End: 2025-03-15 | Stop reason: HOSPADM

## 2025-03-14 RX ORDER — OXYTOCIN/0.9 % SODIUM CHLORIDE 30/500 ML
2-20 PLASTIC BAG, INJECTION (ML) INTRAVENOUS
Status: DISCONTINUED | OUTPATIENT
Start: 2025-03-15 | End: 2025-03-17 | Stop reason: HOSPADM

## 2025-03-14 RX ORDER — FAMOTIDINE 10 MG/ML
20 INJECTION, SOLUTION INTRAVENOUS ONCE AS NEEDED
Status: DISCONTINUED | OUTPATIENT
Start: 2025-03-14 | End: 2025-03-15 | Stop reason: HOSPADM

## 2025-03-14 RX ORDER — TERBUTALINE SULFATE 1 MG/ML
0.25 INJECTION SUBCUTANEOUS AS NEEDED
Status: DISCONTINUED | OUTPATIENT
Start: 2025-03-14 | End: 2025-03-15 | Stop reason: HOSPADM

## 2025-03-14 RX ORDER — ACETAMINOPHEN 325 MG/1
650 TABLET ORAL EVERY 4 HOURS PRN
Status: DISCONTINUED | OUTPATIENT
Start: 2025-03-14 | End: 2025-03-15 | Stop reason: HOSPADM

## 2025-03-14 RX ORDER — ONDANSETRON 2 MG/ML
4 INJECTION INTRAMUSCULAR; INTRAVENOUS ONCE AS NEEDED
Status: DISCONTINUED | OUTPATIENT
Start: 2025-03-14 | End: 2025-03-15 | Stop reason: HOSPADM

## 2025-03-14 RX ORDER — FENTANYL/ROPIVACAINE/NS/PF 2MCG/ML-.2
10 PLASTIC BAG, INJECTION (ML) INJECTION CONTINUOUS
Refills: 0 | Status: DISCONTINUED | OUTPATIENT
Start: 2025-03-14 | End: 2025-03-17 | Stop reason: HOSPADM

## 2025-03-14 RX ADMIN — SODIUM CHLORIDE, SODIUM LACTATE, POTASSIUM CHLORIDE, CALCIUM CHLORIDE 125 ML/HR: 20; 30; 600; 310 INJECTION, SOLUTION INTRAVENOUS at 19:18

## 2025-03-14 NOTE — OBED NOTES
[facility] Note OB    Patient Name: Merly Varner  YOB: 2000  MRN: [id number]  Admission Date: 3/14/2025  5:12 PM  Date of Service: 3/14/2025    Chief Complaint: Rupture of Membranes (Pt states that she thinks her water broke at 1325-9529. +FM. Denies VB and contractions at this time.)        Subjective     Merly Varner is a 24 y.o. female  at 37w4d with Estimated Date of Delivery: 3/31/25 who presents with the chief complaint listed above.  Patient reports that about 230 she had a gush of fluid followed by andrea.  Patient was noted to have a short cervix with funneling at 20 weeks and subsequently had a cerclage placed, at that time cervical length was 0.5 cm.  When patient initially presented she was 3 cm and 70% effaced but there was not gross rupture so a ROM was sent and read as negative.  Her cerclage was removed 2 days ago and her cervix was closed.  When patient was getting ready to leave it was noted that she was grossly ruptured with blood-tinged fluid.  She is not feeling contractions and would like to go natural if possible     She sees Dejuan Robert MD for her prenatal care. Her pregnancy has been complicated by: History of cerclage, history of chlamydia, history of THC use    She describes fetal movement as normal.  She admits to rupture of membranes.  She denies vaginal bleeding. She is not feeling contractions.        Objective   Patient Active Problem List    Diagnosis     Short cervix affecting pregnancy [O26.879]     Amenorrhea [N91.2]     Missed menses [N92.6]     Obesity (BMI 30-39.9) [E66.9]     Nausea and vomiting during pregnancy prior to 22 weeks gestation [O21.9]         OB History    Para Term  AB Living   2 0 0 0 1 0   SAB IAB Ectopic Molar Multiple Live Births   0 0 0 0 0 0      # Outcome Date GA Lbr Jose/2nd Weight Sex Type Anes PTL Lv   2 Current            1 AB 2024 7w0d               Past Medical History:   Diagnosis Date    Chlamydia      treated       Past Surgical History:   Procedure Laterality Date    CERVICAL CERCLAGE N/A 12/6/2024    Procedure: CERVICAL CERCLAGE;  Surgeon: Haley Kirkland MD;  Location: [facility] LABOR DELIVERY;  Service: Obstetrics;  Laterality: N/A;    WISDOM TOOTH EXTRACTION         No current facility-administered medications on file prior to encounter.     Current Outpatient Medications on File Prior to Encounter   Medication Sig Dispense Refill    docusate sodium (Colace) 100 MG capsule Take 1 capsule by mouth 2 (Two) Times a Day. 60 capsule 1    doxylamine (Unisom SleepTabs) 25 MG tablet Take 1 tablet by mouth Every Night. 30 tablet 1    ondansetron (Zofran) 4 MG tablet Take 1 tablet by mouth Every 4 (Four) Hours As Needed for Nausea or Vomiting. 30 tablet 1    polyethylene glycol (MIRALAX) 17 GM/SCOOP powder Take 17 g by mouth Daily. 572 g 5    Prenatal Vit-Fe Fumarate-FA (prenatal vitamin 27-0.8) 27-0.8 MG tablet tablet Take  by mouth Daily.      vitamin B-6 (PYRIDOXINE) 25 MG tablet Take 1 tablet by mouth Every Night. 30 tablet 1    Progesterone (PROMETRIUM) 200 MG capsule Insert 1 capsule into the vagina every night at bedtime. 90 capsule 5       No Known Allergies    Family History   Problem Relation Age of Onset    Cancer Other     Breast cancer Other     Colon cancer Neg Hx     Ovarian cancer Neg Hx     Uterine cancer Neg Hx        Social History     Socioeconomic History    Marital status: Single   Tobacco Use    Smoking status: Never     Passive exposure: Never    Smokeless tobacco: Never   Vaping Use    Vaping status: Never Used   Substance and Sexual Activity    Alcohol use: Not Currently    Drug use: Not Currently     Comment: history of marjuana use, stopped at 16 weeks 10/13/2024    Sexual activity: Yes     Partners: Male           Review of Systems   Constitutional:  Negative for chills and fever.   HENT: Negative.     Eyes:  Negative for photophobia and visual disturbance.   Respiratory:  Negative for  shortness of breath.    Cardiovascular:  Negative for chest pain.   Gastrointestinal:  Negative for nausea.   Genitourinary:  Positive for vaginal discharge.   Psychiatric/Behavioral:  The patient is not nervous/anxious.           PHYSICAL EXAM:      VITAL SIGNS:  Vitals:    03/14/25 1757 03/14/25 1800 03/14/25 1801 03/14/25 1805   BP: 108/66  104/68    BP Location:       Patient Position:       Pulse: 89 80 83 86   Resp:       Temp:       TempSrc:       SpO2:  98%  98%   Weight:       Height:                FHT'S:                       Baseline:         Fetal HR Baseline: normal range                   Beats/min:       Fetal HR (beats/min): 145        Variability:        Fetal HR Variability: moderate (amplitude range 6 to 25 bpm)  Accelerations:  Fetal HR Accelerations: absent  Decelerations:  Fetal HR Decelerations: absent      Interpretation:  reassuring and category 1                                     PHYSICAL EXAM:      General: well developed; well nourished  no acute distress  mentation appropriate   Heart: Not performed.   Lungs   breathing is unlabored   Abdomen: Gravid and non tender     Extremities: trace edema, DTRs 1 plus, no clonus       Cervix: Per RN  Cervical Dilation (cm): 3  Cervical Effacement: 70  Fetal Station: -1  Cervical Consistency: soft  Cervical Position: posterior     Contractions:   Irregular mild                    LABS AND TESTING ORDERED:  Uterine and fetal monitoring  Urinalysis  Admit labs    LAB RESULTS:    Recent Results (from the past 24 hours)   Urinalysis With Culture If Indicated - Urine, Clean Catch    Collection Time: 03/14/25  5:30 PM    Specimen: Urine, Clean Catch   Result Value Ref Range    Color, UA Yellow Yellow, Straw    Appearance, UA Clear Clear    pH, UA 6.0 5.0 - 8.0    Specific Gravity, UA 1.030 1.005 - 1.030    Glucose, UA Negative Negative    Ketones, UA Trace (A) Negative    Bilirubin, UA Negative Negative    Blood, UA Negative Negative    Protein, UA  Trace (A) Negative    Leuk Esterase, UA Small (1+) (A) Negative    Nitrite, UA Negative Negative    Urobilinogen, UA 1.0 E.U./dL 0.2 - 1.0 E.U./dL   Rapid Assay For ROM - Amniotic Fluid, Amniotic Sac    Collection Time: 03/14/25  5:30 PM    Specimen: Amniotic Sac; Amniotic Fluid   Result Value Ref Range    Rupture of Membranes Negative Negative   Urinalysis, Microscopic Only - Urine, Clean Catch    Collection Time: 03/14/25  5:30 PM    Specimen: Urine, Clean Catch   Result Value Ref Range    RBC, UA 0-2 None Seen, 0-2 /HPF    WBC, UA 6-10 (A) None Seen, 0-2 /HPF    Bacteria, UA None Seen None Seen /HPF    Squamous Epithelial Cells, UA 3-6 (A) None Seen, 0-2 /HPF    Hyaline Casts, UA None Seen None Seen /LPF    Mucus, UA Small/1+ (A) None Seen, Trace /HPF    Methodology Manual Light Microscopy        Lab Results   Component Value Date    ABO A 12/06/2024    RH Positive 12/06/2024       Lab Results   Component Value Date    STREPGPB Negative 03/05/2025                 External Prenatal Results       Pregnancy Outside Results - Transcribed From Office Records - See Scanned Records For Details       Test Value Date Time    ABO  A  12/06/24 1705    Rh  Positive  12/06/24 1705    Antibody Screen  Negative  12/06/24 1705       Negative  10/10/24 1057    Varicella IgG       Rubella  5.79 index 10/10/24 1057    Hgb  12.1 g/dL 01/08/25        13.8 g/dL 12/06/24 1705       12.5 g/dL 10/10/24 1057    Hct  37.5 % 01/08/25        41.5 % 12/06/24 1705       38.5 % 10/10/24 1057    HgB A1c        1h GTT  116 mg/dL 01/08/25     3h GTT Fasting       3h GTT 1 hour       3h GTT 2 hour       3h GTT 3 hour        Gonorrhea (discrete)  Negative  10/10/24 1136    Chlamydia (discrete)  Negative  10/10/24 1136    RPR  Non Reactive  10/10/24 1057    Syphils cascade: TP-Ab (FTA)  Non Reactive  01/08/25        Non-Reactive  12/06/24 1705    TP-Ab  Non Reactive  01/08/25        Non-Reactive  12/06/24 1705    TP-Ab (EIA)       TPPA       HBsAg   Negative  10/10/24 1057    Herpes Simplex Virus PCR       Herpes Simplex VIrus Culture       HIV  Non Reactive  10/10/24 1057    Hep C RNA Quant PCR       Hep C Antibody       AFP       NIPT       Cystic Fibrosis ([place])       Cystic Fibroisis        Spinal Muscular atrophy       Fragile X       Group B Strep  Negative  25 1007    GBS Susceptibility to Clindamycin       GBS Susceptibility to Erythromycin       Fetal Fibronectin       Genetic Testing, Maternal Blood                 Drug Screening       Test Value Date Time    Urine Drug Screen       Amphetamine Screen  Negative ng/mL 10/10/24 1136    Barbiturate Screen  Negative ng/mL 10/10/24 1136    Benzodiazepine Screen  Negative ng/mL 10/10/24 1136    Methadone Screen  Negative ng/mL 10/10/24 1136    Phencyclidine Screen  Negative ng/mL 10/10/24 1136    Opiates Screen       THC Screen       Cocaine Screen       Propoxyphene Screen  Negative ng/mL 10/10/24 1136    Buprenorphine Screen       Methamphetamine Screen       Oxycodone Screen       Tricyclic Antidepressants Screen                 Legend    ^: Historical                              Impression:   @ 37w4d .  Final Diagnosis: SROM with blood-tinged fluid, history of short cervix and status post cerclage removal    Plan:  Discussed with Dr. Marsh who will admit, fetal and uterine monitoring  continuously, expectant management, labor augmentation  Pitocin, and analgesia with  epidural if patient desires        Keila Parish MD  3/14/2025  18:24 EDT

## 2025-03-14 NOTE — H&P
H&P Note    Patient Identification:  Name: Merly LAM Varner  Age: 24 y.o.  Sex: female  :  2000  MRN: [id number]                       Chief Complaint: Rupture of membranes    History of Present Illness:   24-year-old  2 para 0 presented at 37-4/7 weeks with a gush of clear fluid.  Initially, testing on OB ED was negative for rupture of membranes.  Prior to discharge, the patient experienced an additional gush and was reevaluated by Dr. Parish.  She was noted to be grossly ruptured and had changed her cervix from 1 to 3 cm.  At this point, the patient was admitted due to rupture of membranes and possible labor.  Group B strep status is negative.  The patient's prenatal course with Dr. Robert had been complicated by cervical shortening.  Cervical cerclage was removed on .    Problem List:  [unfilled]  Past Medical History:  Past Medical History:   Diagnosis Date    Chlamydia 2018    treated     Past Surgical History:  Past Surgical History:   Procedure Laterality Date    CERVICAL CERCLAGE N/A 2024    Procedure: CERVICAL CERCLAGE;  Surgeon: Haley Kirkland MD;  Location: [facility] LABOR DELIVERY;  Service: Obstetrics;  Laterality: N/A;    WISDOM TOOTH EXTRACTION        Home Meds:  Medications Prior to Admission   Medication Sig Dispense Refill Last Dose/Taking    docusate sodium (Colace) 100 MG capsule Take 1 capsule by mouth 2 (Two) Times a Day. 60 capsule 1 Past Month    doxylamine (Unisom SleepTabs) 25 MG tablet Take 1 tablet by mouth Every Night. 30 tablet 1 3/13/2025    ondansetron (Zofran) 4 MG tablet Take 1 tablet by mouth Every 4 (Four) Hours As Needed for Nausea or Vomiting. 30 tablet 1 3/13/2025    polyethylene glycol (MIRALAX) 17 GM/SCOOP powder Take 17 g by mouth Daily. 572 g 5 Past Month    Prenatal Vit-Fe Fumarate-FA (prenatal vitamin 27-0.8) 27-0.8 MG tablet tablet Take  by mouth Daily.   3/13/2025    vitamin B-6 (PYRIDOXINE) 25 MG tablet Take 1 tablet by mouth Every Night. 30  tablet 1 Past Month    Progesterone (PROMETRIUM) 200 MG capsule Insert 1 capsule into the vagina every night at bedtime. 90 capsule 5 More than a month     Current Meds:   [unfilled]  Allergies:  No Known Allergies  Immunizations:  Immunization History   Administered Date(s) Administered    Fluzone  >6mos 2024     Social History:   Social History     Tobacco Use    Smoking status: Never     Passive exposure: Never    Smokeless tobacco: Never   Substance Use Topics    Alcohol use: Not Currently      Family History:  Family History   Problem Relation Age of Onset    Cancer Other     Breast cancer Other     Colon cancer Neg Hx     Ovarian cancer Neg Hx     Uterine cancer Neg Hx         Review of Systems  A comprehensive review of systems was negative.    Objective:  tMax 24 hrs: Temp (24hrs), Av.5 °F (36.9 °C), Min:98.5 °F (36.9 °C), Max:98.5 °F (36.9 °C)    Vitals Ranges:   Temp:  [98.5 °F (36.9 °C)] 98.5 °F (36.9 °C)  Heart Rate:  [80-92] 92  Resp:  [16] 16  BP: (104-153)/(66-88) 110/72  Intake and Output Last 3 Shifts:   No intake/output data recorded.    Exam:     General Appearance:    Alert, cooperative, no distress, appears stated age   Head:    Normocephalic, without obvious abnormality, atraumatic   Back:     Symmetric, no curvature, ROM normal, no CVA tenderness   Lungs:     Clear to auscultation bilaterally, respirations unlabored   Chest Wall:    No tenderness or deformity    Heart:    Regular rate and rhythm, S1 and S2 normal, no murmur, rub   or gallop       Abdomen:   Soft, gravid   Genitalia:  Cervix is 3 cm dilated per RN exam   Rectal:  Not examined today   Extremities: Equal in size   Skin:   Skin color, texture, turgor normal, no rashes or lesions   Lymph nodes:   Cervical, supraclavicular, and axillary nodes normal       Data Review:  Reactive NST  Lab Results (last 24 hours)       Procedure Component Value Units Date/Time    Treponema pallidum AB w/Reflex RPR [[id number]] Collected: 25  1916    Specimen: Blood Updated: 03/14/25 1929    Comprehensive Metabolic Panel [[id number]] Collected: 03/14/25 1916    Specimen: Blood Updated: 03/14/25 1929    CBC & Differential [[id number]] Collected: 03/14/25 1916    Specimen: Blood Updated: 03/14/25 1928    Narrative:      The following orders were created for panel order CBC & Differential.  Procedure                               Abnormality         Status                     ---------                               -----------         ------                     CBC Auto Differential[[id number]]                            In process                   Please view results for these tests on the individual orders.    CBC Auto Differential [[id number]] Collected: 03/14/25 1916    Specimen: Blood Updated: 03/14/25 1928    Urinalysis, Microscopic Only - Urine, Clean Catch [[id number]]  (Abnormal) Collected: 03/14/25 1730    Specimen: Urine, Clean Catch Updated: 03/14/25 1821     RBC, UA 0-2 /HPF      WBC, UA 6-10 /HPF      Bacteria, UA None Seen /HPF      Squamous Epithelial Cells, UA 3-6 /HPF      Hyaline Casts, UA None Seen /LPF      Mucus, UA Small/1+ /HPF      Methodology Manual Light Microscopy    Urinalysis With Culture If Indicated - Urine, Clean Catch [[id number]]  (Abnormal) Collected: 03/14/25 1730    Specimen: Urine, Clean Catch Updated: 03/14/25 1801     Color, UA Yellow     Appearance, UA Clear     pH, UA 6.0     Specific Gravity, UA 1.030     Glucose, UA Negative     Ketones, UA Trace     Bilirubin, UA Negative     Blood, UA Negative     Protein, UA Trace     Leuk Esterase, UA Small (1+)     Nitrite, UA Negative     Urobilinogen, UA 1.0 E.U./dL    Narrative:      In absence of clinical symptoms, the presence of pyuria, bacteria, and/or nitrites on the urinalysis result does not correlate with infection.    Rapid Assay For ROM - Amniotic Fluid, Amniotic Sac [[id number]]  (Normal) Collected: 03/14/25 1730    Specimen: Amniotic Fluid from Amniotic Sac Updated:  03/14/25 1758     Rupture of Membranes Negative    Urine Culture - Urine, Urine, Clean Catch [[id number]] Collected: 03/14/25 1730    Specimen: Urine, Clean Catch Updated: 03/14/25 1741          Assessment:    Pregnancy      1.  Intrauterine pregnancy at 37-4/7 weeks  2.  Spontaneous rupture of membranes  3.  Cervical change with possible labor    Plan:  Admit, augment as needed.  Anticipate a vaginal delivery.    Felipe Marsh MD  3/14/2025

## 2025-03-15 PROCEDURE — 0UQMXZZ REPAIR VULVA, EXTERNAL APPROACH: ICD-10-PCS | Performed by: STUDENT IN AN ORGANIZED HEALTH CARE EDUCATION/TRAINING PROGRAM

## 2025-03-15 PROCEDURE — 25010000002 ROPIVACAINE PER 1 MG: Performed by: ANESTHESIOLOGY

## 2025-03-15 PROCEDURE — 25810000003 LACTATED RINGERS PER 1000 ML: Performed by: OBSTETRICS & GYNECOLOGY

## 2025-03-15 PROCEDURE — C1755 CATHETER, INTRASPINAL: HCPCS | Performed by: ANESTHESIOLOGY

## 2025-03-15 PROCEDURE — 59400 OBSTETRICAL CARE: CPT | Performed by: STUDENT IN AN ORGANIZED HEALTH CARE EDUCATION/TRAINING PROGRAM

## 2025-03-15 RX ORDER — OXYTOCIN/0.9 % SODIUM CHLORIDE 30/500 ML
250 PLASTIC BAG, INJECTION (ML) INTRAVENOUS CONTINUOUS
Status: DISPENSED | OUTPATIENT
Start: 2025-03-15 | End: 2025-03-15

## 2025-03-15 RX ORDER — IBUPROFEN 600 MG/1
600 TABLET, FILM COATED ORAL EVERY 6 HOURS PRN
Status: DISCONTINUED | OUTPATIENT
Start: 2025-03-15 | End: 2025-03-17 | Stop reason: HOSPADM

## 2025-03-15 RX ORDER — DIPHENHYDRAMINE HCL 25 MG
25 CAPSULE ORAL NIGHTLY PRN
Status: DISCONTINUED | OUTPATIENT
Start: 2025-03-15 | End: 2025-03-17 | Stop reason: HOSPADM

## 2025-03-15 RX ORDER — OXYTOCIN/0.9 % SODIUM CHLORIDE 30/500 ML
999 PLASTIC BAG, INJECTION (ML) INTRAVENOUS ONCE
Status: COMPLETED | OUTPATIENT
Start: 2025-03-15 | End: 2025-03-15

## 2025-03-15 RX ORDER — DOCUSATE SODIUM 100 MG/1
100 CAPSULE, LIQUID FILLED ORAL 2 TIMES DAILY
Status: DISCONTINUED | OUTPATIENT
Start: 2025-03-15 | End: 2025-03-17 | Stop reason: HOSPADM

## 2025-03-15 RX ORDER — TRAMADOL HYDROCHLORIDE 50 MG/1
50 TABLET ORAL EVERY 6 HOURS PRN
Status: DISCONTINUED | OUTPATIENT
Start: 2025-03-15 | End: 2025-03-17 | Stop reason: HOSPADM

## 2025-03-15 RX ORDER — BISACODYL 10 MG
10 SUPPOSITORY, RECTAL RECTAL DAILY PRN
Status: DISCONTINUED | OUTPATIENT
Start: 2025-03-16 | End: 2025-03-17 | Stop reason: HOSPADM

## 2025-03-15 RX ORDER — ROPIVACAINE HYDROCHLORIDE 2 MG/ML
INJECTION, SOLUTION EPIDURAL; INFILTRATION; PERINEURAL AS NEEDED
Status: DISCONTINUED | OUTPATIENT
Start: 2025-03-15 | End: 2025-03-15 | Stop reason: SURG

## 2025-03-15 RX ORDER — METHYLERGONOVINE MALEATE 0.2 MG/ML
200 INJECTION INTRAVENOUS ONCE AS NEEDED
Status: DISCONTINUED | OUTPATIENT
Start: 2025-03-15 | End: 2025-03-15 | Stop reason: HOSPADM

## 2025-03-15 RX ORDER — ONDANSETRON 4 MG/1
4 TABLET, ORALLY DISINTEGRATING ORAL EVERY 8 HOURS PRN
Status: DISCONTINUED | OUTPATIENT
Start: 2025-03-15 | End: 2025-03-17 | Stop reason: HOSPADM

## 2025-03-15 RX ORDER — CARBOPROST TROMETHAMINE 250 UG/ML
250 INJECTION, SOLUTION INTRAMUSCULAR
Status: DISCONTINUED | OUTPATIENT
Start: 2025-03-15 | End: 2025-03-15 | Stop reason: HOSPADM

## 2025-03-15 RX ORDER — SODIUM CHLORIDE 0.9 % (FLUSH) 0.9 %
1-10 SYRINGE (ML) INJECTION AS NEEDED
Status: DISCONTINUED | OUTPATIENT
Start: 2025-03-15 | End: 2025-03-17 | Stop reason: HOSPADM

## 2025-03-15 RX ORDER — ONDANSETRON 2 MG/ML
4 INJECTION INTRAMUSCULAR; INTRAVENOUS EVERY 6 HOURS PRN
Status: DISCONTINUED | OUTPATIENT
Start: 2025-03-15 | End: 2025-03-17 | Stop reason: HOSPADM

## 2025-03-15 RX ORDER — MISOPROSTOL 200 UG/1
800 TABLET ORAL ONCE AS NEEDED
Status: DISCONTINUED | OUTPATIENT
Start: 2025-03-15 | End: 2025-03-15 | Stop reason: HOSPADM

## 2025-03-15 RX ORDER — HYDROCORTISONE 25 MG/G
1 CREAM TOPICAL AS NEEDED
Status: DISCONTINUED | OUTPATIENT
Start: 2025-03-15 | End: 2025-03-17 | Stop reason: HOSPADM

## 2025-03-15 RX ORDER — OXYTOCIN/0.9 % SODIUM CHLORIDE 30/500 ML
125 PLASTIC BAG, INJECTION (ML) INTRAVENOUS ONCE AS NEEDED
Status: COMPLETED | OUTPATIENT
Start: 2025-03-15 | End: 2025-03-15

## 2025-03-15 RX ORDER — TRANEXAMIC ACID 10 MG/ML
1000 INJECTION, SOLUTION INTRAVENOUS ONCE AS NEEDED
Status: DISCONTINUED | OUTPATIENT
Start: 2025-03-15 | End: 2025-03-17 | Stop reason: HOSPADM

## 2025-03-15 RX ORDER — ACETAMINOPHEN 325 MG/1
650 TABLET ORAL EVERY 6 HOURS PRN
Status: DISCONTINUED | OUTPATIENT
Start: 2025-03-15 | End: 2025-03-17 | Stop reason: HOSPADM

## 2025-03-15 RX ORDER — PRENATAL VIT/IRON FUM/FOLIC AC 27MG-0.8MG
1 TABLET ORAL DAILY
Status: DISCONTINUED | OUTPATIENT
Start: 2025-03-15 | End: 2025-03-17 | Stop reason: HOSPADM

## 2025-03-15 RX ADMIN — Medication 2 MILLI-UNITS/MIN: at 00:33

## 2025-03-15 RX ADMIN — Medication 250 ML/HR: at 11:35

## 2025-03-15 RX ADMIN — IBUPROFEN 600 MG: 600 TABLET, FILM COATED ORAL at 16:23

## 2025-03-15 RX ADMIN — SODIUM CHLORIDE, SODIUM LACTATE, POTASSIUM CHLORIDE, CALCIUM CHLORIDE 125 ML/HR: 20; 30; 600; 310 INJECTION, SOLUTION INTRAVENOUS at 07:47

## 2025-03-15 RX ADMIN — Medication 999 ML/HR: at 11:19

## 2025-03-15 RX ADMIN — Medication 250 ML/HR: at 12:01

## 2025-03-15 RX ADMIN — ROPIVACAINE HYDROCHLORIDE 5 ML: 2 INJECTION, SOLUTION EPIDURAL; INFILTRATION at 05:40

## 2025-03-15 RX ADMIN — Medication 10 ML/HR: at 05:41

## 2025-03-15 RX ADMIN — SODIUM CHLORIDE, SODIUM LACTATE, POTASSIUM CHLORIDE, CALCIUM CHLORIDE 125 ML/HR: 20; 30; 600; 310 INJECTION, SOLUTION INTRAVENOUS at 03:44

## 2025-03-15 RX ADMIN — DOCUSATE SODIUM 100 MG: 100 CAPSULE, LIQUID FILLED ORAL at 20:42

## 2025-03-15 RX ADMIN — Medication 125 ML/HR: at 12:42

## 2025-03-15 RX ADMIN — PRENATAL VITAMINS-IRON FUMARATE 27 MG IRON-FOLIC ACID 0.8 MG TABLET 1 TABLET: at 16:23

## 2025-03-15 NOTE — ANESTHESIA PROCEDURE NOTES
Labor Epidural    Pre-sedation assessment completed: 3/15/2025 5:31 AM    Patient reassessed immediately prior to procedure    Patient location during procedure: OB  Start Time: 3/15/2025 5:34 AM  Stop Time: 3/15/2025 5:41 AM  Performed By  Anesthesiologist: Tushar Lares DO  Preanesthetic Checklist  Completed: patient identified, IV checked, site marked, risks and benefits discussed, surgical consent, monitors and equipment checked, pre-op evaluation and timeout performed  Prep:  Pt Position:sitting  Sterile Tech:cap, gloves, mask and sterile barrier  Prep:chlorhexidine gluconate and isopropyl alcohol  Monitoring:blood pressure monitoring, continuous pulse oximetry and EKG  Epidural Block Procedure:  Approach:midline  Guidance:landmark technique and palpation technique  Location:L3-L4  Needle Type:Tuohy  Needle Gauge:17 G  Loss of Resistance Medium: air  Loss of Resistance: 8cm  Cath Depth at skin:14 cm  Paresthesia: none  Aspiration:negative  Test Dose:negative  Number of Attempts: 1  Post Assessment:  Dressing:biopatch applied, occlusive dressing applied and secured with tape  Pt Tolerance:patient tolerated the procedure well with no apparent complications  Complications:no

## 2025-03-15 NOTE — ANESTHESIA POSTPROCEDURE EVALUATION
Patient: Merly LAM Varner    Procedure Summary       Date: 03/15/25 Room / Location:     Anesthesia Start: 0526 Anesthesia Stop: 1116    Procedure: LABOR ANALGESIA Diagnosis:     Scheduled Providers:  Provider: Te Elizondo MD    Anesthesia Type: epidural ASA Status: 2            Anesthesia Type: epidural    Vitals  Vitals Value Taken Time   /61 03/15/25 12:02   Temp 36.7 °C (98.1 °F) 03/15/25 11:38   Pulse 87 03/15/25 12:02   Resp 16 03/15/25 11:38   SpO2 89 % 03/15/25 10:54   Vitals shown include unfiled device data.        Post Anesthesia Care and Evaluation      Comments: delivered

## 2025-03-15 NOTE — ANESTHESIA PREPROCEDURE EVALUATION
Anesthesia Evaluation     Patient summary reviewed   no history of anesthetic complications:                Airway   Mallampati: II  TM distance: >3 FB  Neck ROM: full  No difficulty expected  Dental - normal exam     Pulmonary     breath sounds clear to auscultation  (-) asthma, shortness of breath, recent URI  Cardiovascular   Exercise tolerance: good (4-7 METS)    Rhythm: regular  Rate: normal    (-) past MI, dysrhythmias, angina      Neuro/Psych  (-) seizures, CVA  GI/Hepatic/Renal/Endo    (+) morbid obesity  (-) GERD, liver disease, no renal disease, diabetes    Musculoskeletal     (-) neck stiffness  Abdominal    Substance History      OB/GYN    (+) Pregnant  (-) Preeclampsia        Other        (-) blood dyscrasia              Anesthesia Plan    ASA 2     epidural     (IUP 37w5d)    Anesthetic plan, risks, benefits, and alternatives have been provided, discussed and informed consent has been obtained with: patient.    CODE STATUS:    Code Status (Patient has no pulse and is not breathing): CPR (Attempt to Resuscitate)  Medical Interventions (Patient has pulse or is breathing): Full Support  Level Of Support Discussed With: Patient

## 2025-03-15 NOTE — LACTATION NOTE
Pt latching baby now. Educated on importance of deep latching and ways to achieve it. Baby is latching well at this time. Encouraged to BF at least every 2-3 hours for 10-15 min on each breast. Encouraged pt to review provided booklet on BF and call for assistance as needed. Pt has personal pump    Lactation Consult Note    Evaluation Completed: 3/15/2025 18:37 EDT  Patient Name: Merly Varner  :  2000  MRN:  [id number]     REFERRAL  INFORMATION:                                         DELIVERY HISTORY:        Skin to skin initiation date/time: 3/15/2025 11:17 AM  Skin to skin end date/time:           MATERNAL ASSESSMENT:                               INFANT ASSESSMENT:  Information for the patient's :  VarnerDeanna mcrae [[id number]]   No past medical history on file.                                                                                                  MATERNAL INFANT FEEDING:                                                                       EQUIPMENT TYPE:                                 BREAST PUMPING:          LACTATION REFERRALS:

## 2025-03-15 NOTE — PROGRESS NOTES
The patient is feeling mild pain from contractions  Fetal heart tones are reactive  The cervix is 80% effaced and 5 cm dilated with the presenting part at -1    Good early progress in labor.  Will allow labor to progress.  Augment if needed.

## 2025-03-15 NOTE — PROGRESS NOTES
OB Progress Note    S: Patient is resting comfortably with an epidural. She denies any complaints currently.     O:   Temp:  [97.7 °F (36.5 °C)-98.6 °F (37 °C)] 97.7 °F (36.5 °C)  Heart Rate:  [67-97] 67  Resp:  [16-18] 16  BP: ()/(47-88) 104/49    Gen: well appearing, NAD  Abd: soft, non-tender, gravid  SVE: 6-7/80/0     FHT: 130s baseline, moderate variability, accels +, decels -. Category 1  Garnett: Q 2-4 mins     A/P: Merly Varner is a 24 y.o.  at 37w5d who is admitted for labor with SROM since 1430 yesterday.   - She was admitted and starting on pitocin for augmentation.   - She has an epidural in place for pain control.  - Continue on CEFM and tocometry.  - Will monitor labor progress and place an IUPC at next cervical check if she is not making progress.  - Anticipate a vaginal delivery at this time.    Demetrice Flores MD

## 2025-03-15 NOTE — L&D DELIVERY NOTE
[facility]   Vaginal Delivery Note    Patient Name: Merly Varner  : 2000  MRN: [id number]    Date of Delivery: 3/15/2025    Diagnosis     Pre & Post-Delivery:  Intrauterine pregnancy at 37w5d  Labor status: Spontaneous Onset of Labor     (spontaneous vaginal delivery)  History of cervical shortening s/p cerclage            Problem List    Transfer to Postpartum     Review the Delivery Report for details.     Delivery     Delivery: Vaginal, Spontaneous    YOB: 2024   Time of Birth:  Gestational Age 11:16 AM  37w5d     Anesthesia: Epidural    Delivering clinician: Demetrice Flores   Forceps?   No   Vacuum? No    Shoulder dystocia present: No        Delivery narrative:       Procedure: Spontaneous vaginal delivery    Surgeon: Demetrice Flores MD    Preop diagnosis:  24 y.o.  year old  in active labor at 37w5d complicated by short cervix with history of cerclage that was removed on 3/5/25        Postop diagnosis: Same    Indications: Merly Varner is a 24 y.o.  at 37w5d who presented with leakage of fluid and labor. She was initially evaluated in the OB ED and was negative for rupture of membranes but prior to discharge, the patient experienced an additional gush of fluid and was noted to be grossly ruptured with cervical change from 1 cm to 3 cm dilated. She was thus admitted for labor with SROM. Pitocin augmentation was added as the patient progressed slowly initially. She received an epidural for pain. She then continued to progress along a normal labor curve to complete dilation at 1055. She then pushed for 12 minutes to delivery her infant.    Findings: Male infant, Apgar 8/9, Weight TBD; left labial lacerations noted and repaired    Anesthesia: Epidural     QBL: 100 cc    Placenta: Delivered spontaneously intact and discarded     Cord gases: n/a    Complications: None    Procedure:The cervix was noted to be completely dilated, completely effaced, and +2 station. Under  "continuous fetal heart rate monitoring, the patient was encouraged to push. With good maternal effort she delivered a viable male infant. The head presented in the OA presentation and restituted to TIMOTHY. There was no nuchal cord present. The left posterior arm then delivered in compound presentation followed by the right anterior fetal shoulder  with a gentle downward motion followed by the remainder of the infant without difficulty. The infant was immediately vigorous. The cord was clamped roughly 60 seconds following delivery. The cord was cut and the infant was placed on mother's chest. Cord blood was then collected. The placenta then delivered spontaneously intact, and a three vessel cord was noted. Uterine message and pitocin 20 units IV was given until the fundus was firm. The cervix, vagina, perineum, and rectum were carefully inspected for lacerations and left labial laceration noted. This was repaired in running locked fashion with 3-0 Vicryl to achieve hemostasis. Counts for needles, sponges, laps and instruments were correct times two at the end of the delivery. There were no sponges left in the vagina. I was present and scrubbed for the entire delivery. There were no major complications. Mother and baby were bonding well at the end of the delivery.      Infant     Findings: male infant     Infant observations: Weight: No birth weight on file.  Length:   in  Observations/Comments:        Apgars: 8  @ 1 minute /    9  @ 5 minutes   Infant Name: Marlo     Placenta & Cord         Placenta delivered  Spontaneous at   3/15/2025 11:19 AM    Cord: 3 vessels present.   Nuchal Cord?  no   Cord blood obtained: Yes   Cord gases obtained:  No   Cord gas results: Venous:  No results found for: \"PHCVEN\", \"BECVEN\"    Arterial:  No results found for: \"PHCART\", \"BECART\"     Repair     Episiotomy: None    No    Lacerations: Yes  Laceration Information  Laceration Repaired?   Perineal: None     Periurethral:       Labial: " left Yes   Sulcus:       Vaginal:       Cervical:         Suture used for repair: 3-0 Vicryl  Laceration Length for 3rd or 4th degree lacerations: n/a    Estimated Blood Loss:       Quantitative Blood Loss: Quantitative Blood Loss (mL): 100 mL (03/15/25 1130)        Complications     none    Disposition     Mother to Mother Baby/Postpartum  in stable condition currently.  Baby to remains with mom  in stable condition currently.    Demetrice Flores MD  03/15/25  11:41 EDT

## 2025-03-15 NOTE — PLAN OF CARE
Problem: Adult Inpatient Plan of Care  Goal: Plan of Care Review  Outcome: Progressing  Flowsheets (Taken 3/15/2025 1310)  Outcome Evaluation: Spotaneous vaginal delivery at approx 1116, VS wnl and pain during recovery.  Breast fed in recovery.  Anticipate transfer to mother baby unit with no complications.  Goal: Patient-Specific Goal (Individualized)  Outcome: Progressing  Flowsheets (Taken 3/15/2025 1310)  Patient/Family-Specific Goals (Include Timeframe): healthy mom and baby by discharge  Individualized Care Needs: breast feeding  Anxieties, Fears or Concerns: first baby  Goal: Absence of Hospital-Acquired Illness or Injury  Outcome: Progressing  Intervention: Identify and Manage Fall Risk  Recent Flowsheet Documentation  Taken 3/15/2025 1130 by Maggy Goldman RN  Safety Promotion/Fall Prevention:   activity supervised   clutter free environment maintained   fall prevention program maintained   nonskid shoes/slippers when out of bed   safety round/check completed  Taken 3/15/2025 0710 by Maggy Goldman RN  Safety Promotion/Fall Prevention:   activity supervised   clutter free environment maintained   fall prevention program maintained   nonskid shoes/slippers when out of bed   safety round/check completed  Intervention: Prevent Infection  Recent Flowsheet Documentation  Taken 3/15/2025 1245 by Maggy Goldman, RN  Infection Prevention:   cohorting utilized   environmental surveillance performed   equipment surfaces disinfected   hand hygiene promoted   personal protective equipment utilized   rest/sleep promoted   single patient room provided   visitors restricted/screened  Taken 3/15/2025 1230 by Maggy Goldman, RN  Infection Prevention:   cohorting utilized   environmental surveillance performed   equipment surfaces disinfected   hand hygiene promoted   personal protective equipment utilized   rest/sleep promoted   single patient room provided   visitors restricted/screened  Taken 3/15/2025 1215 by  Maggy Goldman RN  Infection Prevention:   cohorting utilized   environmental surveillance performed   equipment surfaces disinfected   hand hygiene promoted   personal protective equipment utilized   rest/sleep promoted   single patient room provided   visitors restricted/screened  Taken 3/15/2025 1145 by Maggy Goldman RN  Infection Prevention:   cohorting utilized   environmental surveillance performed   equipment surfaces disinfected   hand hygiene promoted   personal protective equipment utilized   rest/sleep promoted   single patient room provided   visitors restricted/screened  Taken 3/15/2025 1138 by Maggy Goldman RN  Infection Prevention:   cohorting utilized   environmental surveillance performed   equipment surfaces disinfected   hand hygiene promoted   personal protective equipment utilized   rest/sleep promoted   single patient room provided   visitors restricted/screened  Taken 3/15/2025 1130 by Maggy Goldman RN  Infection Prevention:   cohorting utilized   environmental surveillance performed   equipment surfaces disinfected   hand hygiene promoted   personal protective equipment utilized   rest/sleep promoted   single patient room provided   visitors restricted/screened  Taken 3/15/2025 0710 by Maggy Goldman RN  Infection Prevention:   cohorting utilized   environmental surveillance performed   equipment surfaces disinfected   hand hygiene promoted   personal protective equipment utilized   rest/sleep promoted   single patient room provided   visitors restricted/screened  Goal: Optimal Comfort and Wellbeing  Outcome: Progressing  Intervention: Monitor Pain and Promote Comfort  Recent Flowsheet Documentation  Taken 3/15/2025 0710 by Maggy Goldman RN  Pain Management Interventions: pain pump in use  Intervention: Provide Person-Centered Care  Recent Flowsheet Documentation  Taken 3/15/2025 1130 by Maggy Goldman RN  Trust Relationship/Rapport:   care explained   choices  provided   emotional support provided   empathic listening provided   questions answered   questions encouraged   reassurance provided   thoughts/feelings acknowledged  Taken 3/15/2025 0710 by Maggy Goldman RN  Trust Relationship/Rapport:   care explained   choices provided   emotional support provided   empathic listening provided   questions answered   questions encouraged   reassurance provided   thoughts/feelings acknowledged  Goal: Readiness for Transition of Care  Outcome: Progressing     Problem: Labor  Goal: Hemostasis  Outcome: Progressing  Goal: Stable Fetal Wellbeing  Outcome: Progressing  Goal: Effective Progression to Delivery  Outcome: Progressing  Goal: Absence of Infection Signs and Symptoms  Outcome: Progressing  Intervention: Prevent or Manage Infection  Recent Flowsheet Documentation  Taken 3/15/2025 1245 by Maggy Goldman RN  Infection Prevention:   cohorting utilized   environmental surveillance performed   equipment surfaces disinfected   hand hygiene promoted   personal protective equipment utilized   rest/sleep promoted   single patient room provided   visitors restricted/screened  Taken 3/15/2025 1230 by Maggy Goldman RN  Infection Prevention:   cohorting utilized   environmental surveillance performed   equipment surfaces disinfected   hand hygiene promoted   personal protective equipment utilized   rest/sleep promoted   single patient room provided   visitors restricted/screened  Taken 3/15/2025 1215 by Maggy Goldman RN  Infection Prevention:   cohorting utilized   environmental surveillance performed   equipment surfaces disinfected   hand hygiene promoted   personal protective equipment utilized   rest/sleep promoted   single patient room provided   visitors restricted/screened  Taken 3/15/2025 1145 by Maggy Goldman RN  Infection Prevention:   cohorting utilized   environmental surveillance performed   equipment surfaces disinfected   hand hygiene promoted   personal  protective equipment utilized   rest/sleep promoted   single patient room provided   visitors restricted/screened  Taken 3/15/2025 1138 by Maggy Goldman RN  Infection Prevention:   cohorting utilized   environmental surveillance performed   equipment surfaces disinfected   hand hygiene promoted   personal protective equipment utilized   rest/sleep promoted   single patient room provided   visitors restricted/screened  Taken 3/15/2025 1130 by Maggy Goldman RN  Infection Prevention:   cohorting utilized   environmental surveillance performed   equipment surfaces disinfected   hand hygiene promoted   personal protective equipment utilized   rest/sleep promoted   single patient room provided   visitors restricted/screened  Taken 3/15/2025 0710 by Maggy Goldman RN  Infection Prevention:   cohorting utilized   environmental surveillance performed   equipment surfaces disinfected   hand hygiene promoted   personal protective equipment utilized   rest/sleep promoted   single patient room provided   visitors restricted/screened  Goal: Acceptable Pain Control  Outcome: Progressing  Goal: Normal Uterine Contraction Pattern  Outcome: Progressing     Problem: Skin Injury Risk Increased  Goal: Skin Health and Integrity  Outcome: Progressing  Intervention: Optimize Skin Protection  Recent Flowsheet Documentation  Taken 3/15/2025 1145 by Maggy Goldman RN  Activity Management: bedrest  Taken 3/15/2025 0710 by Maggy Goldman RN  Activity Management: bedrest   Goal Outcome Evaluation:              Outcome Evaluation: Spotaneous vaginal delivery at approx 1116, VS wnl and pain during recovery.  Breast fed in recovery.  Anticipate transfer to mother baby unit with no complications.

## 2025-03-16 LAB
BACTERIA SPEC AEROBE CULT: NO GROWTH
BASOPHILS # BLD AUTO: 0.04 10*3/MM3 (ref 0–0.2)
BASOPHILS NFR BLD AUTO: 0.3 % (ref 0–1.5)
DEPRECATED RDW RBC AUTO: 41.1 FL (ref 37–54)
EOSINOPHIL # BLD AUTO: 0.06 10*3/MM3 (ref 0–0.4)
EOSINOPHIL NFR BLD AUTO: 0.5 % (ref 0.3–6.2)
ERYTHROCYTE [DISTWIDTH] IN BLOOD BY AUTOMATED COUNT: 13 % (ref 12.3–15.4)
HCT VFR BLD AUTO: 36.4 % (ref 34–46.6)
HGB BLD-MCNC: 11.9 G/DL (ref 12–15.9)
IMM GRANULOCYTES # BLD AUTO: 0.07 10*3/MM3 (ref 0–0.05)
IMM GRANULOCYTES NFR BLD AUTO: 0.6 % (ref 0–0.5)
LYMPHOCYTES # BLD AUTO: 2.82 10*3/MM3 (ref 0.7–3.1)
LYMPHOCYTES NFR BLD AUTO: 23.4 % (ref 19.6–45.3)
MCH RBC QN AUTO: 28.4 PG (ref 26.6–33)
MCHC RBC AUTO-ENTMCNC: 32.7 G/DL (ref 31.5–35.7)
MCV RBC AUTO: 86.9 FL (ref 79–97)
MONOCYTES # BLD AUTO: 0.71 10*3/MM3 (ref 0.1–0.9)
MONOCYTES NFR BLD AUTO: 5.9 % (ref 5–12)
NEUTROPHILS NFR BLD AUTO: 69.3 % (ref 42.7–76)
NEUTROPHILS NFR BLD AUTO: 8.34 10*3/MM3 (ref 1.7–7)
NRBC BLD AUTO-RTO: 0 /100 WBC (ref 0–0.2)
PLATELET # BLD AUTO: 192 10*3/MM3 (ref 140–450)
PMV BLD AUTO: 9.9 FL (ref 6–12)
RBC # BLD AUTO: 4.19 10*6/MM3 (ref 3.77–5.28)
WBC NRBC COR # BLD AUTO: 12.04 10*3/MM3 (ref 3.4–10.8)

## 2025-03-16 PROCEDURE — 0503F POSTPARTUM CARE VISIT: CPT | Performed by: OBSTETRICS & GYNECOLOGY

## 2025-03-16 PROCEDURE — 85025 COMPLETE CBC W/AUTO DIFF WBC: CPT | Performed by: STUDENT IN AN ORGANIZED HEALTH CARE EDUCATION/TRAINING PROGRAM

## 2025-03-16 RX ADMIN — DOCUSATE SODIUM 100 MG: 100 CAPSULE, LIQUID FILLED ORAL at 09:53

## 2025-03-16 RX ADMIN — PRENATAL VITAMINS-IRON FUMARATE 27 MG IRON-FOLIC ACID 0.8 MG TABLET 1 TABLET: at 09:53

## 2025-03-16 RX ADMIN — Medication: at 06:15

## 2025-03-16 RX ADMIN — IBUPROFEN 600 MG: 600 TABLET, FILM COATED ORAL at 09:53

## 2025-03-16 NOTE — PROGRESS NOTES
Crissy LAM Varner  : 2000  MRN: [id number]  CSN: [id number]    Hospital Day: 3    Postpartum Day #1  Subjective     CC: hospital follow-up    Her pain is well controlled. Vaginal bleeding is normal in amount. She is ambulating without difficulty. She is passing gas. She is voiding without difficulty. Her baby is doing well..    She reports bleeding as decreased.  She reports pain control as adequate.  She is voiding with no difficulty. She is breast feeding.    Objective     Min/max vitals past 24 hours:   Temp  Min: 97.9 °F (36.6 °C)  Max: 98.7 °F (37.1 °C)  BP  Min: 116/69  Max: 126/65  Pulse  Min: 73  Max: 84  Resp  Min: 16  Max: 17        General:  Psych:                 comfortable, well appearing, and in no acute distress  mood and affect are within normal limits   Abdomen: soft, non-tender; no masses  no umbilical or inguinal hernias are present  no hepato-splenomegaly  fundus firm and non-tender   Pelvic: Not performed   Ext: Calves NT, negative by clinical screen.     Lab Results   Component Value Date    WBC 12.04 (H) 2025    HGB 11.9 (L) 2025    HCT 36.4 2025    MCV 86.9 2025     2025    RH Positive 2025    HEPBSAG Negative 10/10/2024        Assessment   Postpartum Day #1 S/P vaginal delivery  Doing well  Male infant.  Undecided about circumcision.  She asked appropriate questions and I told her about the [employer] position statement that the benefits do outweigh the risks but that it is still an elective procedure.  We discussed the possible benefits versus the risks and the procedure itself and she will hold off at least until tomorrow if she wants to do it.     Plan   Continue routine postpartum care  Anticipate discharge home on postpartum day 2.    Dejuan Robert MD  3/16/2025  14:26 EDT

## 2025-03-16 NOTE — PLAN OF CARE
Problem: Adult Inpatient Plan of Care  Goal: Plan of Care Review  Outcome: Progressing  Flowsheets (Taken 3/15/2025 2101)  Progress: improving  Outcome Evaluation: 0po, VSS, assessment wnl, pain controlled with motrin, bleeding light, ambulating independently, voiding spontaneously, breastfeeding infant  Plan of Care Reviewed With: patient  Goal: Patient-Specific Goal (Individualized)  Outcome: Progressing  Goal: Absence of Hospital-Acquired Illness or Injury  Outcome: Progressing  Intervention: Identify and Manage Fall Risk  Recent Flowsheet Documentation  Taken 3/15/2025 2040 by Anneliese Simpson RN  Safety Promotion/Fall Prevention: safety round/check completed  Intervention: Prevent Skin Injury  Recent Flowsheet Documentation  Taken 3/15/2025 2040 by Anneliese Simpson RN  Body Position: position changed independently  Intervention: Prevent Infection  Recent Flowsheet Documentation  Taken 3/15/2025 2040 by Anneliese Simpson RN  Infection Prevention: hand hygiene promoted  Goal: Optimal Comfort and Wellbeing  Outcome: Progressing  Intervention: Provide Person-Centered Care  Recent Flowsheet Documentation  Taken 3/15/2025 2040 by Anneliese Simpson RN  Trust Relationship/Rapport:   care explained   choices provided   questions answered   questions encouraged  Goal: Readiness for Transition of Care  Outcome: Progressing     Problem: Labor  Goal: Hemostasis  Outcome: Progressing  Goal: Stable Fetal Wellbeing  Outcome: Progressing  Intervention: Promote and Monitor Fetal Wellbeing  Recent Flowsheet Documentation  Taken 3/15/2025 2040 by Anneliese Simpson RN  Body Position: position changed independently  Goal: Effective Progression to Delivery  Outcome: Progressing  Goal: Absence of Infection Signs and Symptoms  Outcome: Progressing  Intervention: Prevent or Manage Infection  Recent Flowsheet Documentation  Taken 3/15/2025 2040 by Anneliese Simpson RN  Infection Prevention: hand hygiene promoted  Goal:  Acceptable Pain Control  Outcome: Progressing  Goal: Normal Uterine Contraction Pattern  Outcome: Progressing     Problem: Skin Injury Risk Increased  Goal: Skin Health and Integrity  Outcome: Progressing  Intervention: Optimize Skin Protection  Recent Flowsheet Documentation  Taken 3/15/2025 2040 by Anneliese Simpson RN  Activity Management:   up ad brian   ambulated in room  Head of Bed (HOB) Positioning: HOB elevated   Goal Outcome Evaluation:  Plan of Care Reviewed With: patient        Progress: improving  Outcome Evaluation: 0po, VSS, assessment wnl, pain controlled with motrin, bleeding light, ambulating independently, voiding spontaneously, breastfeeding infant

## 2025-03-16 NOTE — LACTATION NOTE
Pt reports she is BF and giving some formula supplement. She reports she BF on both breasts first before giving formula. Educated on supply and demand, when to expect mature milk to come in. Encouraged pt to call LC as needed. Pt wants personal pump review before d/c.  Lactation Consult Note    Evaluation Completed: 3/16/2025 10:33 EDT  Patient Name: Merly Varner  :  2000  MRN:  [id number]     REFERRAL  INFORMATION:                                         DELIVERY HISTORY:        Skin to skin initiation date/time: 3/15/2025 11:17 AM  Skin to skin end date/time:           MATERNAL ASSESSMENT:                               INFANT ASSESSMENT:  Information for the patient's :  Deanna Varner [[id number]]   No past medical history on file.                                                                                                  MATERNAL INFANT FEEDING:                                                                       EQUIPMENT TYPE:                                 BREAST PUMPING:          LACTATION REFERRALS:

## 2025-03-17 VITALS
DIASTOLIC BLOOD PRESSURE: 75 MMHG | TEMPERATURE: 98.8 F | RESPIRATION RATE: 16 BRPM | OXYGEN SATURATION: 100 % | HEIGHT: 65 IN | HEART RATE: 92 BPM | SYSTOLIC BLOOD PRESSURE: 109 MMHG | WEIGHT: 247 LBS | BODY MASS INDEX: 41.15 KG/M2

## 2025-03-17 PROCEDURE — 0503F POSTPARTUM CARE VISIT: CPT | Performed by: NURSE PRACTITIONER

## 2025-03-17 RX ORDER — DOCUSATE SODIUM 100 MG/1
100 CAPSULE, LIQUID FILLED ORAL 2 TIMES DAILY
Qty: 60 CAPSULE | Refills: 1 | Status: SHIPPED | OUTPATIENT
Start: 2025-03-17

## 2025-03-17 RX ORDER — IBUPROFEN 600 MG/1
600 TABLET, FILM COATED ORAL EVERY 6 HOURS PRN
Qty: 90 TABLET | Refills: 1 | Status: SHIPPED | OUTPATIENT
Start: 2025-03-17

## 2025-03-17 RX ADMIN — IBUPROFEN 600 MG: 600 TABLET, FILM COATED ORAL at 08:35

## 2025-03-17 RX ADMIN — DOCUSATE SODIUM 100 MG: 100 CAPSULE, LIQUID FILLED ORAL at 08:35

## 2025-03-17 RX ADMIN — PRENATAL VITAMINS-IRON FUMARATE 27 MG IRON-FOLIC ACID 0.8 MG TABLET 1 TABLET: at 08:35

## 2025-03-17 NOTE — DISCHARGE SUMMARY
Date of Discharge:  3/17/2025    Discharge Diagnosis: s/p vaginal delivery     Presenting Problem/History of Present Illness  Pregnancy [Z34.90]     Hospital Course  Patient is a 24 y.o. female presented leaking fluid.  Her pregnancy was complicated by shortened cervix, s/p cerclage removal 3/5/25.  She delivered a viable male infant weighing 6lb 6.8oz with apgars of 8&9. For further information surrounding this delivery please seen delivery note. Her postpartum course has been uncomplicated. She is voiding adequately, tolerating a regular diet, and she is ambulating without difficulty or assistance. Her pain is well controlled. We have reviewed discharge instructions in detail.     Procedures Performed         Consults:   Consults       Date and Time Order Name Status Description    3/14/2025  6:49 PM Inpatient Anesthesiology Consult              Pertinent Test Results:   WBC   Date Value Ref Range Status   03/16/2025 12.04 (H) 3.40 - 10.80 10*3/mm3 Final     RBC   Date Value Ref Range Status   03/16/2025 4.19 3.77 - 5.28 10*6/mm3 Final     Hemoglobin   Date Value Ref Range Status   03/16/2025 11.9 (L) 12.0 - 15.9 g/dL Final     Hematocrit   Date Value Ref Range Status   03/16/2025 36.4 34.0 - 46.6 % Final     MCV   Date Value Ref Range Status   03/16/2025 86.9 79.0 - 97.0 fL Final     MCH   Date Value Ref Range Status   03/16/2025 28.4 26.6 - 33.0 pg Final     MCHC   Date Value Ref Range Status   03/16/2025 32.7 31.5 - 35.7 g/dL Final     RDW   Date Value Ref Range Status   03/16/2025 13.0 12.3 - 15.4 % Final     RDW-SD   Date Value Ref Range Status   03/16/2025 41.1 37.0 - 54.0 fl Final     MPV   Date Value Ref Range Status   03/16/2025 9.9 6.0 - 12.0 fL Final     Platelets   Date Value Ref Range Status   03/16/2025 192 140 - 450 10*3/mm3 Final     Neutrophil %   Date Value Ref Range Status   03/16/2025 69.3 42.7 - 76.0 % Final     Lymphocyte %   Date Value Ref Range Status   03/16/2025 23.4 19.6 - 45.3 % Final      Monocyte %   Date Value Ref Range Status   03/16/2025 5.9 5.0 - 12.0 % Final     Eosinophil %   Date Value Ref Range Status   03/16/2025 0.5 0.3 - 6.2 % Final     Basophil %   Date Value Ref Range Status   03/16/2025 0.3 0.0 - 1.5 % Final     Immature Grans %   Date Value Ref Range Status   03/16/2025 0.6 (H) 0.0 - 0.5 % Final     Neutrophils, Absolute   Date Value Ref Range Status   03/16/2025 8.34 (H) 1.70 - 7.00 10*3/mm3 Final     Lymphocytes, Absolute   Date Value Ref Range Status   03/16/2025 2.82 0.70 - 3.10 10*3/mm3 Final     Monocytes, Absolute   Date Value Ref Range Status   03/16/2025 0.71 0.10 - 0.90 10*3/mm3 Final     Eosinophils, Absolute   Date Value Ref Range Status   03/16/2025 0.06 0.00 - 0.40 10*3/mm3 Final     Basophils, Absolute   Date Value Ref Range Status   03/16/2025 0.04 0.00 - 0.20 10*3/mm3 Final     Immature Grans, Absolute   Date Value Ref Range Status   03/16/2025 0.07 (H) 0.00 - 0.05 10*3/mm3 Final     nRBC   Date Value Ref Range Status   03/16/2025 0.0 0.0 - 0.2 /100 WBC Final       Condition on Discharge:  Stable    Vital Signs  Temp:  [98.2 °F (36.8 °C)-98.8 °F (37.1 °C)] 98.8 °F (37.1 °C)  Heart Rate:  [86-92] 92  Resp:  [16] 16  BP: (109-123)/(75-81) 109/75    Physical Exam:   See Progress Note    Discharge Disposition  Home or Self Care    Discharge Medications     Discharge Medications        New Medications        Instructions Start Date   ibuprofen 600 MG tablet  Commonly known as: ADVIL,MOTRIN   600 mg, Oral, Every 6 Hours PRN             Continue These Medications        Instructions Start Date   docusate sodium 100 MG capsule  Commonly known as: Colace   100 mg, Oral, 2 Times Daily      ondansetron 4 MG tablet  Commonly known as: Zofran   4 mg, Oral, Every 4 Hours PRN      polyethylene glycol 17 GM/SCOOP powder  Commonly known as: MIRALAX   17 g, Oral, Daily      prenatal vitamin 27-0.8 27-0.8 MG tablet tablet   Daily      Unisom SleepTabs 25 MG tablet  Generic drug:  doxylamine   25 mg, Oral, Nightly             Stop These Medications      Progesterone 200 MG capsule  Commonly known as: PROMETRIUM     vitamin B-6 25 MG tablet  Commonly known as: PYRIDOXINE              Discharge Diet: Regular    Activity at Discharge: Pelvic rest X6 weeks    Education: Warning signs and symptoms given, no tub baths, nothing in the vagina for 6 weeks, no driving for 2 weeks or while talking narcotics     Follow-up Appointments  Future Appointments   Date Time Provider Department Center   3/19/2025 11:30 AM Dejuan Robert MD [facility]   3/26/2025 11:30 AM Dejuan Robert MD [facility]     Additional Instructions for the Follow-ups that You Need to Schedule       Discharge Follow-up with Specified Provider: Jakob; 6 Weeks   As directed      To: Jakob   Follow Up: 6 Weeks   Follow Up Details: postpartum follow up                Test Results Pending at Discharge       Haley Matias, APRN  03/17/25  09:47 EDT    Time: 09:47 EDT

## 2025-03-17 NOTE — PROGRESS NOTES
"Discharge Planning Assessment  [facility]     Patient Name: Merly Varner  MRN: [id number]  Today's Date: 3/17/2025    Admit Date: 3/14/2025    Plan: Infant may discharge to mother when medically ready; CSW will follow cord tox. ROXANE Flynn.   Discharge Needs Assessment    No documentation.                  Discharge Plan       Row Name 03/17/25 1100       Plan    Plan Infant may discharge to mother when medically ready; CSW will follow cord tox. ROXANE Flynn.    Plan Comments Mother: Merly Varner, MRN: [id number]; infant: Deanna \"Marlo\" Varner, MRN: [id number]. CSW consulted for \"THC use.\" Of note, mother's UDS was positive for THC prenatally on 10/10. Mother's UDS was not collected on admit. Infant's UDS was missed; cord toxicology sent. CSW met with mother at bedside while father of infant/SO and MGM were in the room. Mother gave consent for father and MGM to be present during assessment. Mother verified address, phone number, and insurance. Mother reports she understands the process of adding infant to health insurance. Mother reports having a car seat, crib/bassinet, clothes, and diapers for infant. This is mother and father's first baby. Mother reports, maternal grandma and father of infant/SO are available for support as needed. [facility]; mother is comfortable scheduling appointments for infant and has reliable transportation. Mother is not current with [facility] but is familiar with the program. CSW provided mother with a packet of resources including: WIC, HANDS, transportation, infant supplies, counseling, online support groups, postpartum mood and anxiety resources, and general community resources. CSW spent time building rapport with mother, and offered validation, support, and encouragement to mother throughout assessment. Mother and father were polite and appropriate, and denied having unmet needs or concerns at this time. CSW will " follow cord toxicology and complete mandated reporting to CPS if warranted. ROXANE Flynn.                    Expected Discharge Date and Time       Expected Discharge Date Expected Discharge Time    Mar 17, 2025            Demographic Summary       Row Name 03/17/25 1059       General Information    Admission Type inpatient    Arrived From home    Referral Source nursing    Reason for Consult substance use concerns    General Information Comments THC use                   Functional Status       Row Name 03/17/25 1059       Functional Status, IADL    Medications independent    Meal Preparation independent    Housekeeping independent    Laundry independent    Shopping independent       Mental Status    General Appearance WDL WDL       Mental Status Summary    Recent Changes in Mental Status/Cognitive Functioning no changes       Employment/    Employment Status employed full-time    Employment/ Comments Den                   Psychosocial       Row Name 03/17/25 1059       Behavior WDL    Behavior WDL WDL       Emotion Mood WDL    Emotion/Mood/Affect WDL WDL       Speech WDL    Speech WDL WDL       Perceptual State WDL    Perceptual State WDL WDL       Thought Process WDL    Thought Process WDL WDL       Intellectual Performance WDL    Intellectual Performance WDL WDL       Coping/Stress    Major Change/Loss/Stressor birth    Patient Personal Strengths future/goal oriented;motivated;positive attitude    Sources of Support parent(s);significant other                   Abuse/Neglect       Row Name 03/17/25 1100       Personal Safety    Physical Signs of Abuse Present no                   Legal    No documentation.                  Substance Abuse       Row Name 03/17/25 1100       Substance Use    Substance Use Comment No UDS mom or infant; cord tox sent.                   Patient Forms    No documentation.                     RAGHU Ortiz

## 2025-03-17 NOTE — PROGRESS NOTES
Postpartum Progress Note      Status post Vaginal Delivery: Doing well postoperatively.     1) Postpartum care immediately following delivery :    Routine care, continue current care. Discharge home today. Reviewed discharge instructions in detail    Hgb: 12.1-->11.9  Rh status: A+  Syphilis screen in hospital: nonreactive  Gender: male, desires circumcision     Subjective     Postpartum Day 2: Vaginal delivery    The patient feels well. The patient denies emotional concerns. Pain is well controlled with current medications. The baby is well. The patient is ambulating well. The patient is tolerating a normal diet.     Objective     Vital signs in last 24 hours:  Temp:  [98.2 °F (36.8 °C)-98.8 °F (37.1 °C)] 98.8 °F (37.1 °C)  Heart Rate:  [86-92] 92  Resp:  [16] 16  BP: (109-123)/(75-81) 109/75      General:    alert, appears stated age, and cooperative   CV: RRR, no m/r/g   Lungs: CTAB   Abdomen:  Soft, Non-tender    Lochia:  appropriate   Uterine Fundus:   firm   Ext    Edema trace   DVT Evaluation:  No evidence of DVT seen on physical exam.     Lab Results   Component Value Date    WBC 12.04 (H) 03/16/2025    HGB 11.9 (L) 03/16/2025    HCT 36.4 03/16/2025    MCV 86.9 03/16/2025     03/16/2025       Haley Matias, APRN  3/17/2025  09:44 EDT

## 2025-03-17 NOTE — PAYOR COMM NOTE
"Merly Ocasio (24 y.o. Female)       Date of Birth   2000    Social Security Number       Address   [address] [place] [address]    Home Phone   [phone]    MRN   [id number]       Judaism   None    Marital Status   Single                            Admission Date   3/14/2025    Admission Type   Emergency    Admitting Provider   Felipe Marsh MD    Attending Provider   Dejuan Robert MD    Department, Room/Bed   [address], [address]/1       Discharge Date       Discharge Disposition       Discharge Destination                                 Attending Provider: Dejuan Robert MD    Allergies: No Known Allergies    Isolation: None   Infection: None   Code Status: CPR    Ht: 165.1 cm (65\")   Wt: 112 kg (247 lb)    Admission Cmt: None   Principal Problem: Pregnancy [Z34.90]                   Active Insurance as of 3/14/2025       Primary Coverage       Payor Plan Insurance Group Employer/Plan Group    [employer] AN[employer]M BLUE CROSS BLUE SHIELD PPO [id number]       Payor Plan Address Payor Plan Phone Number Payor Plan Fax Number Effective Dates    [address] [phone]  2024 - None Entered    [facility] [id number]         Subscriber Name Subscriber Birth Date Member ID       MERLY OCASIO 2000 [id number]                     Emergency Contacts        (Rel.) Home Phone Work Phone Mobile Phone    TUNDE ROBLES (Significant Other) [phone] -- --              Insurance Information                  [employer]HE[employer] BLUE CROSS/[employer]HE[employer] BLUE CROSS BLUE SHIELD PPO Phone: [phone]    Subscriber: Merly Ocasio Subscriber#: [id number]    Group#: [id number] Precert#: --    Authorization#: -- Effective Date: --          Problem List           Codes Noted - Resolved       Hospital     (spontaneous vaginal delivery) ICD-10-CM: O80  ICD-9-CM: 650 3/15/2025 - Present    * (Principal) Pregnancy ICD-10-CM: Z34.90  ICD-9-CM: V22.2 3/14/2025 - Present "       Non-Hospital    Short cervix affecting pregnancy ICD-10-CM: O26.879  ICD-9-CM: 649.70 2024 - Present    Amenorrhea ICD-10-CM: N91.2  ICD-9-CM: 626.0 10/7/2024 - Present    Missed menses ICD-10-CM: N92.6  ICD-9-CM: 626.4 10/7/2024 - Present    Obesity (BMI 30-39.9) ICD-10-CM: E66.9  ICD-9-CM: 278.00 10/7/2024 - Present    Nausea and vomiting during pregnancy prior to 22 weeks gestation ICD-10-CM: O21.9  ICD-9-CM: 643.00 10/7/2024 - Present        History & Physical        Felipe Marsh MD at 25          H&P Note    Patient Identification:  Name: Merly Rauscher  Age: 24 y.o.  Sex: female  :  2000  MRN: [id number]                       Chief Complaint: Rupture of membranes    History of Present Illness:   24-year-old  2 para 0 presented at 37-4/7 weeks with a gush of clear fluid.  Initially, testing on OB ED was negative for rupture of membranes.  Prior to discharge, the patient experienced an additional gush and was reevaluated by Dr. Parish.  She was noted to be grossly ruptured and had changed her cervix from 1 to 3 cm.  At this point, the patient was admitted due to rupture of membranes and possible labor.  Group B strep status is negative.  The patient's prenatal course with Dr. Robert had been complicated by cervical shortening.  Cervical cerclage was removed on .    [facility]:  [facility]  Past Medical History:  Past Medical History:   Diagnosis Date    Chlamydia 2018    treated     Past Surgical History:  Past Surgical History:   Procedure Laterality Date    CERVICAL CERCLAGE N/A 2024    Procedure: CERVICAL CERCLAGE;  Surgeon: Haley Kirkland MD;  Location: [facility] LABOR DELIVERY;  Service: Obstetrics;  Laterality: N/A;    WISDOM TOOTH EXTRACTION        Home Meds:  Medications Prior to Admission   Medication Sig Dispense Refill Last Dose/Taking    docusate sodium (Colace) 100 MG capsule Take 1 capsule by mouth 2 (Two) Times a Day. 60 capsule 1 Past Month     doxylamine (Unisom SleepTabs) 25 MG tablet Take 1 tablet by mouth Every Night. 30 tablet 1 3/13/2025    ondansetron (Zofran) 4 MG tablet Take 1 tablet by mouth Every 4 (Four) Hours As Needed for Nausea or Vomiting. 30 tablet 1 3/13/2025    polyethylene glycol (MIRALAX) 17 GM/SCOOP powder Take 17 g by mouth Daily. 572 g 5 Past Month    Prenatal Vit-Fe Fumarate-FA (prenatal vitamin 27-0.8) 27-0.8 MG tablet tablet Take  by mouth Daily.   3/13/2025    vitamin B-6 (PYRIDOXINE) 25 MG tablet Take 1 tablet by mouth Every Night. 30 tablet 1 Past Month    Progesterone (PROMETRIUM) 200 MG capsule Insert 1 capsule into the vagina every night at bedtime. 90 capsule 5 More than a month     Current Meds:   [unfilled]  Allergies:  No Known Allergies  Immunizations:  Immunization History   Administered Date(s) Administered    Fluzone  >6mos 2024     Social History:   Social History     Tobacco Use    Smoking status: Never     Passive exposure: Never    Smokeless tobacco: Never   Substance Use Topics    Alcohol use: Not Currently      Family History:  Family History   Problem Relation Age of Onset    Cancer Other     Breast cancer Other     Colon cancer Neg Hx     Ovarian cancer Neg Hx     Uterine cancer Neg Hx         Review of Systems  A comprehensive review of systems was negative.    Objective:  tMax 24 hrs: Temp (24hrs), Av.5 °F (36.9 °C), Min:98.5 °F (36.9 °C), Max:98.5 °F (36.9 °C)    Vitals Ranges:   Temp:  [98.5 °F (36.9 °C)] 98.5 °F (36.9 °C)  Heart Rate:  [80-92] 92  Resp:  [16] 16  BP: (104-153)/(66-88) 110/72  Intake and Output Last 3 Shifts:   No intake/output data recorded.    Exam:     General Appearance:    Alert, cooperative, no distress, appears stated age   Head:    Normocephalic, without obvious abnormality, atraumatic   Back:     Symmetric, no curvature, ROM normal, no CVA tenderness   Lungs:     Clear to auscultation bilaterally, respirations unlabored   Chest Wall:    No tenderness or deformity     Heart:    Regular rate and rhythm, S1 and S2 normal, no murmur, rub   or gallop       Abdomen:   Soft, gravid   Genitalia:  Cervix is 3 cm dilated per RN exam   Rectal:  Not examined today   Extremities: Equal in size   Skin:   Skin color, texture, turgor normal, no rashes or lesions   Lymph nodes:   Cervical, supraclavicular, and axillary nodes normal       Data Review:  Reactive NST  Lab Results (last 24 hours)       Procedure Component Value Units Date/Time    Treponema pallidum AB w/Reflex RPR [[id number]] Collected: 03/14/25 1916    Specimen: Blood Updated: 03/14/25 1929    Comprehensive Metabolic Panel [[id number]] Collected: 03/14/25 1916    Specimen: Blood Updated: 03/14/25 1929    CBC & Differential [[id number]] Collected: 03/14/25 1916    Specimen: Blood Updated: 03/14/25 1928    Narrative:      The following orders were created for panel order CBC & Differential.  Procedure                               Abnormality         Status                     ---------                               -----------         ------                     CBC Auto Differential[[id number]]                            In process                   Please view results for these tests on the individual orders.    CBC Auto Differential [[id number]] Collected: 03/14/25 1916    Specimen: Blood Updated: 03/14/25 1928    Urinalysis, Microscopic Only - Urine, Clean Catch [[id number]]  (Abnormal) Collected: 03/14/25 1730    Specimen: Urine, Clean Catch Updated: 03/14/25 1821     RBC, UA 0-2 /HPF      WBC, UA 6-10 /HPF      Bacteria, UA None Seen /HPF      Squamous Epithelial Cells, UA 3-6 /HPF      Hyaline Casts, UA None Seen /LPF      Mucus, UA Small/1+ /HPF      Methodology Manual Light Microscopy    Urinalysis With Culture If Indicated - Urine, Clean Catch [[id number]]  (Abnormal) Collected: 03/14/25 1730    Specimen: Urine, Clean Catch Updated: 03/14/25 1801     Color, UA Yellow     Appearance, UA Clear     pH, UA 6.0     Specific Gravity,  UA 1.030     Glucose, UA Negative     Ketones, UA Trace     Bilirubin, UA Negative     Blood, UA Negative     Protein, UA Trace     Leuk Esterase, UA Small (1+)     Nitrite, UA Negative     Urobilinogen, UA 1.0 E.U./dL    Narrative:      In absence of clinical symptoms, the presence of pyuria, bacteria, and/or nitrites on the urinalysis result does not correlate with infection.    Rapid Assay For ROM - Amniotic Fluid, Amniotic Sac [[id number]]  (Normal) Collected: 03/14/25 1730    Specimen: Amniotic Fluid from Amniotic Sac Updated: 03/14/25 1758     Rupture of Membranes Negative    Urine Culture - Urine, Urine, Clean Catch [[id number]] Collected: 03/14/25 1730    Specimen: Urine, Clean Catch Updated: 03/14/25 1741          Assessment:    Pregnancy      1.  Intrauterine pregnancy at 37-4/7 weeks  2.  Spontaneous rupture of membranes  3.  Cervical change with possible labor    Plan:  Admit, augment as needed.  Anticipate a vaginal delivery.    Felipe Marsh MD  3/14/2025     Electronically signed by Felipe Marsh MD at 03/14/25 1935       Facility-Administered Medications as of 3/17/2025   Medication Dose Route Frequency Provider Last Rate Last Admin    acetaminophen (TYLENOL) tablet 650 mg  650 mg Oral Q6H PRN Demetrice Flores MD        all purpose nipple ointment 1 Application  1 Application Topical 4x Daily PRN Demetrice Flores MD        benzocaine (AMERICAINE) 20 % rectal ointment 1 Application  1 Application Rectal PRN Demetrice Flores MD        benzocaine-menthol (DERMOPLAST) 20-0.5 % topical spray   Topical PRN Demetrice Flores MD   Given at 03/16/25 0615    bisacodyl (DULCOLAX) suppository 10 mg  10 mg Rectal Daily PRN Demetrice Flores MD        diphenhydrAMINE (BENADRYL) capsule 25 mg  25 mg Oral Nightly PRN Demetrice Flores MD        docusate sodium (COLACE) capsule 100 mg  100 mg Oral BID Demetrice Flores MD   100 mg at 03/16/25 0953    fentaNYL 2mcg/mL and ropivacaine 0.2% in NS epidural 100mL  10 mL/hr  Epidural Continuous Artur Cortez MD   Stopped at 03/15/25 1148    Hydrocort-Pramoxine (Perianal) (PROCTOFOAM-HS) 1-1 % rectal foam 1 Application  1 Application Topical PRN Demetrice Flores MD        Hydrocortisone (Perianal) (ANUSOL-HC) 2.5 % rectal cream 1 Application  1 Application Rectal PRN Demetrice Flores MD        ibuprofen (ADVIL,MOTRIN) tablet 600 mg  600 mg Oral Q6H PRN Demetrice Flores MD   600 mg at 25 0953    [] lactated ringers bolus 1,000 mL  1,000 mL Intravenous Once PRN Felipe Marsh MD        [] lactated ringers infusion  125 mL/hr Intravenous Continuous Felipe Marsh  mL/hr at 03/15/25 0747 125 mL/hr at 03/15/25 0747    lanolin topical 1 Application  1 Application Topical Q1H PRN Demetrice Flores MD        magnesium hydroxide (MILK OF MAGNESIA) suspension 10 mL  10 mL Oral Daily PRN Demetrice Flores MD        ondansetron (ZOFRAN) injection 4 mg  4 mg Intravenous Q6H PRN Demetrice Flores MD        ondansetron ODT (ZOFRAN-ODT) disintegrating tablet 4 mg  4 mg Oral Q8H PRN Demetrice Flores MD        [COMPLETED] oxytocin (PITOCIN) 30 units in 0.9% sodium chloride 500 mL (premix)  999 mL/hr Intravenous Once Felipe Marsh  mL/hr at 03/15/25 1119 999 mL/hr at 03/15/25 1119    Followed by    [] oxytocin (PITOCIN) 30 units in 0.9% sodium chloride 500 mL (premix)  250 mL/hr Intravenous Continuous Felipe Marsh  mL/hr at 03/15/25 1201 250 mL/hr at 03/15/25 1201    oxytocin (PITOCIN) 30 units in 0.9% sodium chloride 500 mL (premix)  2-20 fransico-units/min Intravenous Titrated Felipe Marsh MD   Stopped at 03/15/25 1116    [COMPLETED] oxytocin (PITOCIN) 30 units in 0.9% sodium chloride 500 mL (premix)  125 mL/hr Intravenous Once PRN Demetrice Flores  mL/hr at 03/15/25 1242 125 mL/hr at 03/15/25 1242    prenatal vitamin tablet 1 tablet  1 tablet Oral Daily Demetrice Flores MD   1 tablet at 25 0953    sodium chloride 0.9 % flush 1-10 mL   1-10 mL Intravenous PRN Demetrice Flores MD        traMADol (ULTRAM) tablet 50 mg  50 mg Oral Q6H PRN Demetrice Flores MD        tranexamic acid 1000 mg in 100 mL 0.7% NaCl infusion (premix)  1,000 mg Intravenous Once PRN Felipe Marsh MD         Lab Results (last 72 hours)       Procedure Component Value Units Date/Time    Urine Culture - Urine, Urine, Clean Catch [[id number]]  (Normal) Collected: 03/14/25 1730    Specimen: Urine, Clean Catch Updated: 03/16/25 1014     Urine Culture No growth    CBC & Differential [[id number]]  (Abnormal) Collected: 03/16/25 0623    Specimen: Blood Updated: 03/16/25 0637    Narrative:      The following orders were created for panel order CBC & Differential.  Procedure                               Abnormality         Status                     ---------                               -----------         ------                     CBC Auto Differential[[id number]]        Abnormal            Final result                 Please view results for these tests on the individual orders.    CBC Auto Differential [[id number]]  (Abnormal) Collected: 03/16/25 0623    Specimen: Blood Updated: 03/16/25 0637     WBC 12.04 10*3/mm3      RBC 4.19 10*6/mm3      Hemoglobin 11.9 g/dL      Hematocrit 36.4 %      MCV 86.9 fL      MCH 28.4 pg      MCHC 32.7 g/dL      RDW 13.0 %      RDW-SD 41.1 fl      MPV 9.9 fL      Platelets 192 10*3/mm3      Neutrophil % 69.3 %      Lymphocyte % 23.4 %      Monocyte % 5.9 %      Eosinophil % 0.5 %      Basophil % 0.3 %      Immature Grans % 0.6 %      Neutrophils, Absolute 8.34 10*3/mm3      Lymphocytes, Absolute 2.82 10*3/mm3      Monocytes, Absolute 0.71 10*3/mm3      Eosinophils, Absolute 0.06 10*3/mm3      Basophils, Absolute 0.04 10*3/mm3      Immature Grans, Absolute 0.07 10*3/mm3      nRBC 0.0 /100 WBC     Comprehensive Metabolic Panel [[id number]]  (Abnormal) Collected: 03/14/25 1916    Specimen: Blood Updated: 03/14/25 2000     Glucose 88 mg/dL      BUN 5 mg/dL       Creatinine 0.68 mg/dL      Sodium 137 mmol/L      Potassium 4.0 mmol/L      Chloride 105 mmol/L      CO2 23.2 mmol/L      Calcium 8.8 mg/dL      Total Protein 6.4 g/dL      Albumin 3.4 g/dL      ALT (SGPT) 10 U/L      AST (SGOT) 20 U/L      Alkaline Phosphatase 183 U/L      Total Bilirubin 0.2 mg/dL      Globulin 3.0 gm/dL      A/G Ratio 1.1 g/dL      BUN/Creatinine Ratio 7.4     Anion Gap 8.8 mmol/L      eGFR 125.7 mL/min/1.73     Narrative:      GFR Categories in Chronic Kidney Disease (CKD)      GFR Category          GFR (mL/min/1.73)    Interpretation  G1                     90 or greater         Normal or high (1)  G2                      60-89                Mild decrease (1)  G3a                   45-59                Mild to moderate decrease  G3b                   30-44                Moderate to severe decrease  G4                    15-29                Severe decrease  G5                    14 or less           Kidney failure          (1)In the absence of evidence of kidney disease, neither GFR category G1 or G2 fulfill the criteria for CKD.    eGFR calculation 2021 CKD-EPI creatinine equation, which does not include race as a factor    Treponema pallidum AB w/Reflex RPR [[id number]]  (Normal) Collected: 03/14/25 1916    Specimen: Blood Updated: 03/14/25 1959     Treponemal AB Total Non-Reactive    Narrative:      Reactive results will reflex RPR testing.    CBC & Differential [[id number]]  (Abnormal) Collected: 03/14/25 1916    Specimen: Blood Updated: 03/14/25 1936    Narrative:      The following orders were created for panel order CBC & Differential.  Procedure                               Abnormality         Status                     ---------                               -----------         ------                     CBC Auto Differential[[id number]]        Abnormal            Final result                 Please view results for these tests on the individual orders.    CBC Auto Differential  [[id number]]  (Abnormal) Collected: 03/14/25 1916    Specimen: Blood Updated: 03/14/25 1936     WBC 10.44 10*3/mm3      RBC 4.21 10*6/mm3      Hemoglobin 12.1 g/dL      Hematocrit 36.8 %      MCV 87.4 fL      MCH 28.7 pg      MCHC 32.9 g/dL      RDW 12.7 %      RDW-SD 40.7 fl      MPV 10.3 fL      Platelets 194 10*3/mm3      Neutrophil % 71.0 %      Lymphocyte % 20.9 %      Monocyte % 6.6 %      Eosinophil % 0.4 %      Basophil % 0.3 %      Immature Grans % 0.8 %      Neutrophils, Absolute 7.42 10*3/mm3      Lymphocytes, Absolute 2.18 10*3/mm3      Monocytes, Absolute 0.69 10*3/mm3      Eosinophils, Absolute 0.04 10*3/mm3      Basophils, Absolute 0.03 10*3/mm3      Immature Grans, Absolute 0.08 10*3/mm3      nRBC 0.0 /100 WBC     Urinalysis, Microscopic Only - Urine, Clean Catch [[id number]]  (Abnormal) Collected: 03/14/25 1730    Specimen: Urine, Clean Catch Updated: 03/14/25 1821     RBC, UA 0-2 /HPF      WBC, UA 6-10 /HPF      Bacteria, UA None Seen /HPF      Squamous Epithelial Cells, UA 3-6 /HPF      Hyaline Casts, UA None Seen /LPF      Mucus, UA Small/1+ /HPF      Methodology Manual Light Microscopy    Urinalysis With Culture If Indicated - Urine, Clean Catch [[id number]]  (Abnormal) Collected: 03/14/25 1730    Specimen: Urine, Clean Catch Updated: 03/14/25 1801     Color, UA Yellow     Appearance, UA Clear     pH, UA 6.0     Specific Gravity, UA 1.030     Glucose, UA Negative     Ketones, UA Trace     Bilirubin, UA Negative     Blood, UA Negative     Protein, UA Trace     Leuk Esterase, UA Small (1+)     Nitrite, UA Negative     Urobilinogen, UA 1.0 E.U./dL    Narrative:      In absence of clinical symptoms, the presence of pyuria, bacteria, and/or nitrites on the urinalysis result does not correlate with infection.    Rapid Assay For ROM - Amniotic Fluid, Amniotic Sac [[id number]]  (Normal) Collected: 03/14/25 1730    Specimen: Amniotic Fluid from Amniotic Sac Updated: 03/14/25 1751     Rupture of Membranes  Negative          Imaging Results (Last 72 Hours)       ** No results found for the last 72 hours. **          ECG/EMG Results (last 72 hours)       ** No results found for the last 72 hours. **          Orders (last 72 hrs)        Start     Ordered    03/16/25 0800  Sitz Bath  3 Times Daily        Comments: PRN    03/15/25 1440    03/16/25 0600  CBC & Differential  Morning Draw        Comments: Postpartum Day 1      03/15/25 1440    03/16/25 0600  CBC Auto Differential  PROCEDURE ONCE        Comments: Postpartum Day 1      03/15/25 2202    03/16/25 0000  bisacodyl (DULCOLAX) suppository 10 mg  Daily PRN         03/15/25 1440    03/15/25 2100  docusate sodium (COLACE) capsule 100 mg  2 Times Daily         03/15/25 1440    03/15/25 1530  prenatal vitamin tablet 1 tablet  Daily         03/15/25 1440    03/15/25 1441  Transfer to postpartum when discharge criteria met.  Until Discontinued         03/15/25 1440    03/15/25 1441  Transfer Patient  Once         03/15/25 1440    03/15/25 1441  Code Status and Medical Interventions: CPR (Attempt to Resuscitate); Full  Continuous         03/15/25 1440    03/15/25 1441  Vital Signs Per Hospital Policy  Per Hospital Policy/Protocol         03/15/25 1440    03/15/25 1441  Notify Provider  Continuous        Comments: Open Order Report to View Parameters Requiring Provider Notification    03/15/25 1440    03/15/25 1441  Up Ad Nelda  Until Discontinued         03/15/25 1440    03/15/25 1441  Ambulate Patient  Every Shift       03/15/25 1440    03/15/25 1441  Diet: Regular/House; Fluid Consistency: Thin (IDDSI 0)  Diet Effective Now         03/15/25 1440    03/15/25 1441  Advance Diet As Tolerated -Regular  Until Discontinued         03/15/25 1440    03/15/25 1441  Fundal and Lochia Check  Per Order Details        Comments: Every 30 minutes x2, then Every Shift    03/15/25 1440    03/15/25 1441  RN to Assess Rh Status & Place RhIG Evaluation Order if Indicated  Continuous          03/15/25 1440    03/15/25 1441  Bladder Assessment  Per Order Details        Comments: Postpartum 1) Upon Admission to Unit & Every 4 Hours PRN Until Voiding. 2) Out of Bed to Void in 8 Hours.    03/15/25 1440    03/15/25 1441  Straight Cath  Per Order Details        Comments: Postpartum: If Distended & Unable to Void, May Repeat Once.    03/15/25 1440    03/15/25 1441  Indwelling Urinary Catheter  Per Order Details        Comments: Postpartum : After Straight Cathed x2 or if Greater Than 1000mL Residual, Insert Indwelling Urinary Catheter Until Further MD Order.    03/15/25 1440    03/15/25 1441  Apply Ice to Perineum  Per Order Details        Comments: For 20 Minutes Every 2 Hours    03/15/25 1440    03/15/25 1441  Waffle Cushion  Per Order Details        Comments: For Perineal Discomfort    03/15/25 1440    03/15/25 1441  Donut Ring  Per Order Details        Comments: For Perineal Pain    03/15/25 1440    03/15/25 1441  Kpad  Per Order Details        Comments: For Pain    03/15/25 1440    03/15/25 1441  Breast pump to bed  Once         03/15/25 1440    03/15/25 1441  If indicated -- Please administer RH Immunoglobulin based on results of cord blood evaluation and fetal screen lab tests, pharmacy to dispense  Per Order Details        Comments: See Process Instructions For Reference Range Details.    03/15/25 1440    03/15/25 1441  Place Sequential Compression Device  Once         03/15/25 1440    03/15/25 1441  Maintain Sequential Compression Device  Continuous         03/15/25 1440    03/15/25 1440  tranexamic acid 1000 mg in 100 mL 0.7% NaCl infusion (premix)  Once As Needed         03/15/25 1440    03/15/25 1440  sodium chloride 0.9 % flush 1-10 mL  As Needed         03/15/25 1440    03/15/25 1440  acetaminophen (TYLENOL) tablet 650 mg  Every 6 Hours PRN         03/15/25 1440    03/15/25 1440  magnesium hydroxide (MILK OF MAGNESIA) suspension 10 mL  Daily PRN         03/15/25 1440    03/15/25 1440   "benzocaine-menthol (DERMOPLAST) 20-0.5 % topical spray  As Needed         03/15/25 1440    03/15/25 1440  Hydrocort-Pramoxine (Perianal) (PROCTOFOAM-HS) 1-1 % rectal foam 1 Application  As Needed         03/15/25 1440    03/15/25 1440  Hydrocortisone (Perianal) (ANUSOL-HC) 2.5 % rectal cream 1 Application  As Needed         03/15/25 1440    03/15/25 1440  benzocaine (AMERICAINE) 20 % rectal ointment 1 Application  As Needed         03/15/25 1440    03/15/25 1440  traMADol (ULTRAM) tablet 50 mg  Every 6 Hours PRN         03/15/25 1440    03/15/25 1440  diphenhydrAMINE (BENADRYL) capsule 25 mg  Nightly PRN         03/15/25 1440    03/15/25 1440  ondansetron ODT (ZOFRAN-ODT) disintegrating tablet 4 mg  Every 8 Hours PRN         03/15/25 1440    03/15/25 1440  ondansetron (ZOFRAN) injection 4 mg  Every 6 Hours PRN         03/15/25 1440    03/15/25 1440  lanolin topical 1 Application  Every 1 Hour PRN         03/15/25 1440    03/15/25 1440  all purpose nipple ointment 1 Application  4 Times Daily PRN         03/15/25 1440    03/15/25 1247  ibuprofen (ADVIL,MOTRIN) tablet 600 mg  Every 6 Hours PRN         03/15/25 1247    03/15/25 1230  oxytocin (PITOCIN) 30 units in 0.9% sodium chloride 500 mL (premix)  Continuous        Placed in \"Followed by\" Linked Group    03/15/25 1120    03/15/25 1215  oxytocin (PITOCIN) 30 units in 0.9% sodium chloride 500 mL (premix)  Once        Placed in \"Followed by\" Linked Group    03/15/25 1120    03/15/25 1142  Notify Provider (Specified)  Until Discontinued         03/15/25 1141    03/15/25 1142  Vital Signs Per Hospital Policy  Per Hospital Policy/Protocol         03/15/25 1141    03/15/25 1142  Fundal & Lochia Check  Per Order Details,   Status:  Canceled        Comments: Every 15 Minutes x4, Then Every 30 Minutes x2, Then Every Shift    03/15/25 1141    03/15/25 1142  Diet: Regular/House; Fluid Consistency: Thin (IDDSI 0)  Diet Effective Now,   Status:  Canceled         03/15/25 1141    " "03/15/25 1142  Advance Diet As Tolerated -  Until Discontinued         03/15/25 1141    03/15/25 1141  Fundal & Lochia Check  Every Shift       03/15/25 1141    03/15/25 1141  oxytocin (PITOCIN) 30 units in 0.9% sodium chloride 500 mL (premix)  Once As Needed         03/15/25 1141    03/15/25 1120  methylergonovine (METHERGINE) injection 200 mcg  Once As Needed,   Status:  Discontinued         03/15/25 1120    03/15/25 1120  carboprost (HEMABATE) injection 250 mcg  Every 15 Minutes PRN,   Status:  Discontinued         03/15/25 1120    03/15/25 1120  miSOPROStol (CYTOTEC) tablet 800 mcg  Once As Needed,   Status:  Discontinued         03/15/25 1120    03/15/25 0045  oxytocin (PITOCIN) 30 units in 0.9% sodium chloride 500 mL (premix)  Titrated         03/14/25 2347    03/14/25 2000  fentaNYL 2mcg/mL and ropivacaine 0.2% in NS epidural 100mL  Continuous         03/14/25 1901 03/14/25 1945  lactated ringers infusion  Continuous         03/14/25 1849    03/14/25 1902  Vital Signs Per Anesthesia Guidelines  Per Order Details        Comments: Every 2 Minutes x5, Every 5 Minutes x4, Then If Stable Every 15 Minutes    03/14/25 1901 03/14/25 1902  Start IV with #16 or #18 gauge angiocath.  Once         03/14/25 1901 03/14/25 1902  Fetal Heart Rate Monitor  Once         03/14/25 1901 03/14/25 1902  Nurse or anesthesiologist to remain with patient for 15 minutes following dosing.  Until Discontinued         03/14/25 1901 03/14/25 1902  Facilitate maternal postion on side and maintain uterine displacement.  Until Discontinued         03/14/25 1901 03/14/25 1902  Consult anesthesia services prior to changing epidural infusion/rate.  Until Discontinued         03/14/25 1901 03/14/25 1902  Nurse may remove epidural catheter after delivery.  Until Discontinued         03/14/25 1901 03/14/25 1902  Insert Indwelling Urinary Catheter  Once        Placed in \"And\" Linked Group    03/14/25 1901 03/14/25 1902  " "Assess Need for Indwelling Urinary Catheter - Follow Removal Protocol  Continuous        Comments: Indwelling Urinary Catheter Removal Criteria  Discontinue Indwelling Urinary Catheter Unless One of the Following is Present:  Urinary Retention or Obstruction  Chronic Urinary Catheter Use  End of Life  Critical Illness with Strict I/O   Tract or Abdominal Surgery  Stage 3/4 Sacral / Perineal Wound  Required Activity Restriction: Trauma  Required Activity Restriction: Spine Surgery  If Patient is Being Followed by Urology Contact Them PRIOR to Removal  Do Not Remove Indwelling Urinary Catheter Order is Present with a CLINICAL REASON to Maintain the Catheter. Provider is Required to Include a Clinical Reason to Maintain a Urinary Catheter    Patient Admitted With Indwelling Urinary Catheter (Not Placed at [facility])  Assess for Continued Need & Document Medical Necessity  If Infection is Suspected, Contact the Provider       Placed in \"And\" Linked Group    03/14/25 1901 03/14/25 1902  Urinary Catheter Care  Every Shift,   Status:  Canceled      Placed in \"And\" Linked Group    03/14/25 1901 03/14/25 1902  Notify physician for the following conditions:  Until Discontinued         03/14/25 1901 03/14/25 1901  ePHEDrine injection 5 mg  As Needed,   Status:  Discontinued         03/14/25 1901 03/14/25 1901  ondansetron (ZOFRAN) injection 4 mg  Once As Needed,   Status:  Discontinued         03/14/25 1901 03/14/25 1901  famotidine (PEPCID) injection 20 mg  Once As Needed,   Status:  Discontinued         03/14/25 1901 03/14/25 1901  diphenhydrAMINE (BENADRYL) injection 12.5 mg  Every 8 Hours PRN,   Status:  Discontinued         03/14/25 1901 03/14/25 1849  Diet: Liquid; Clear Liquid; Fluid Consistency: Thin (IDDSI 0)  Diet Effective Now,   Status:  Canceled         03/14/25 1849 03/14/25 1848  Admit To Obstetrics Inpatient  Once         03/14/25 1849 03/14/25 1848  Code Status and Medical " Interventions: CPR (Attempt to Resuscitate); Full Support  Continuous,   Status:  Canceled         25  Obtain Informed Consent  Once         25  Place Sequential Compression Device  Once         25  Maintain Sequential Compression Device  Continuous         25  Vital Signs Per Hospital Policy  Per Hospital Policy/Protocol         25  Mini-Prep Prior to Delivery  Once         25  Continuous Fetal Monitoring With NST on Admission and Prior to Initiation of Oxytocin.  Per Order Details        Comments: Continuous Fetal Monitoring With NST on Admission & Prior to Initiation of Oxytocin.    25  External Uterine Contraction Monitoring  Per Hospital Policy/Protocol         25  Notify Provider (Specified)  Until Discontinued         25  Notify Provider of Tachysystole (Per Hospital Algorithm)  Until Discontinued         25  Notify Provider if Membranes Ruptured, Bleeding Greater Than 1 Pad Per Hour, Fetal Heart Tone Abnormality or Severe Pain  Until Discontinued         25  Initiate Group Beta Strep (GBS) Prophylaxis Protocol, If Criteria Met  Continuous        Comments: NO TREATMENT RECOMMENDED IF: 1) Maternal GBS Status Known Negative 2) Scheduled  Birth With Intact Membranes, Not in Labor 3) Maternal GBS Status Unknown, No Risk Factors  TREAT WITH ANTIBIOTICS IF:  1) Maternal GBS Status Known Positive 2) Maternal GBS Status Unknown With Risk Factors: a)  Previous Infant Affected By GBS Infection b) GBS Urinary Tract Infection (UTI) or Bacteriuria During Pregnancy c) Unexplained Maternal Fever (100.4F (38C) or Greater) During Labor d)  Prolonged Rupture of Membranes (18 or More Hours) e) Gestational Age Less  "Than 37 Weeks    03/14/25 1849 03/14/25 1848  Inpatient Anesthesiology Consult  Once        Specialty:  Anesthesiology  Provider:  (Not yet assigned)    03/14/25 1849 03/14/25 1848  Treponema pallidum AB w/Reflex RPR  Once         03/14/25 1849 03/14/25 1848  CBC & Differential  Once         03/14/25 1849 03/14/25 1848  Comprehensive Metabolic Panel  Once         03/14/25 1849 03/14/25 1848  Type & Screen  Once         03/14/25 1849 03/14/25 1848  Insert Peripheral IV  Once         03/14/25 1849 03/14/25 1848  Saline Lock & Maintain IV Access  Continuous         03/14/25 1849 03/14/25 1848  CBC Auto Differential  PROCEDURE ONCE         03/14/25 1849 03/14/25 1847  lactated ringers bolus 1,000 mL  Once As Needed         03/14/25 1849 03/14/25 1847  acetaminophen (TYLENOL) tablet 650 mg  Every 4 Hours PRN,   Status:  Discontinued         03/14/25 1849 03/14/25 1847  butorphanol (STADOL) injection 2 mg  Every 3 Hours PRN,   Status:  Discontinued         03/14/25 1849 03/14/25 1847  ondansetron ODT (ZOFRAN-ODT) disintegrating tablet 4 mg  Every 6 Hours PRN,   Status:  Discontinued        Placed in \"Or\" Linked Group    03/14/25 1849 03/14/25 1847  ondansetron (ZOFRAN) injection 4 mg  Every 6 Hours PRN,   Status:  Discontinued        Placed in \"Or\" Linked Group    03/14/25 1849 03/14/25 1847  terbutaline (BRETHINE) injection 0.25 mg  As Needed,   Status:  Discontinued         03/14/25 1849 03/14/25 1847  famotidine (PEPCID) injection 20 mg  2 Times Daily PRN,   Status:  Discontinued        Placed in \"Or\" Linked Group    03/14/25 1849 03/14/25 1847  famotidine (PEPCID) tablet 20 mg  2 Times Daily PRN,   Status:  Discontinued        Placed in \"Or\" Linked Group    03/14/25 1849 03/14/25 1742  Urinalysis, Microscopic Only - Urine, Clean Catch  Once        Comments: Collect and Hold for gestational age > 24 weeks      03/14/25 1741    03/14/25 1742  Urine Culture - Urine, " Urine, Clean Catch  Once        Comments: Collect and Hold for gestational age > 24 weeks      03/14/25 1741    03/14/25 1719  Rapid Assay For ROM - Amniotic Fluid, Amniotic Sac  Once         03/14/25 1719    03/14/25 1718  Vital Signs  Per Hospital Policy/Protocol        Comments: Repeat blood pressure every 30 minutes, until specified.    03/14/25 1719    03/14/25 1718  Fetal Nonstress Test  Once        Comments: For any patient >= 24 wks gestation.    03/14/25 1719    03/14/25 1718  Pulse Oximetry, Spot  Once         03/14/25 1719    03/14/25 1718  Urinalysis With Culture If Indicated - Urine, Clean Catch  Once        Comments: Collect and Hold for gestational age > 24 weeks      03/14/25 1719    03/14/25 1718  Obtain Fetal Heart Rate - For Gestational Age <24 weeks  Once         03/14/25 1719    03/14/25 1718  Continuous Uterine Monitoring - For Gestational Age >24 weeks  Once         03/14/25 1719    03/14/25 1718  NPO Diet NPO Type: Strict NPO  Diet Effective Now,   Status:  Canceled         03/14/25 1719 03/14/25 1718  Vaginal Exam  Once        Comments: Perform unless patient has vaginal bleeding without known placental site, known placental previa, <36 weeks with no abdominal , or complain of ROM and <36 weeks    03/14/25 1719    03/14/25 1718  POC PH Fluid (Nitrazine)  Once        Comments: If patient is presenting with possible ROM or leaking      03/14/25 1719    Unscheduled  Position Change - For Intra-Uterine Resusitation for Hypertonus, HyperStimulation or Non-Reassuring Fetal Status  As Needed       03/14/25 1849    Unscheduled  Apply witch hazel pads / TUCKS to perineum as needed for comfort PRN  As Needed       03/15/25 1440    Unscheduled  Warm compress  As Needed       03/15/25 1440    Unscheduled  Apply ace wrap, tight bra, or binder  As Needed       03/15/25 1440    Unscheduled  Apply ice packs  As Needed       03/15/25 1440                     Operative/Procedure Notes (last 72 hours)         Demetrice Flores MD at 03/15/25 1140           [facility]   Vaginal Delivery Note    Patient Name: Merly Varner  : 2000  MRN: [id number]    Date of Delivery: 3/15/2025    Diagnosis     Pre & Post-Delivery:  Intrauterine pregnancy at 37w5d  Labor status: Spontaneous Onset of Labor     (spontaneous vaginal delivery)  History of cervical shortening s/p cerclage            Problem List    Transfer to Postpartum     Review the Delivery Report for details.     Delivery     Delivery: Vaginal, Spontaneous    YOB: 2024   Time of Birth:  Gestational Age 11:16 AM  37w5d     Anesthesia: Epidural    Delivering clinician: Demetrice Flores   Forceps?   No   Vacuum? No    Shoulder dystocia present: No        Delivery narrative:       Procedure: Spontaneous vaginal delivery    Surgeon: Demetrice Flores MD    Preop diagnosis:  24 y.o.  year old  in active labor at 37w5d complicated by short cervix with history of cerclage that was removed on 3/5/25        Postop diagnosis: Same    Indications: Merly Varner is a 24 y.o.  at 37w5d who presented with leakage of fluid and labor. She was initially evaluated in the OB ED and was negative for rupture of membranes but prior to discharge, the patient experienced an additional gush of fluid and was noted to be grossly ruptured with cervical change from 1 cm to 3 cm dilated. She was thus admitted for labor with SROM. Pitocin augmentation was added as the patient progressed slowly initially. She received an epidural for pain. She then continued to progress along a normal labor curve to complete dilation at 1055. She then pushed for 12 minutes to delivery her infant.    Findings: Male infant, Apgar 8/9, Weight TBD; left labial lacerations noted and repaired    Anesthesia: Epidural     QBL: 100 cc    Placenta: Delivered spontaneously intact and discarded     Cord gases: n/a    Complications: None    Procedure:The cervix was noted to be completely  "dilated, completely effaced, and +2 station. Under continuous fetal heart rate monitoring, the patient was encouraged to push. With good maternal effort she delivered a viable male infant. The head presented in the OA presentation and restituted to TIMOTHY. There was no nuchal cord present. The left posterior arm then delivered in compound presentation followed by the right anterior fetal shoulder  with a gentle downward motion followed by the remainder of the infant without difficulty. The infant was immediately vigorous. The cord was clamped roughly 60 seconds following delivery. The cord was cut and the infant was placed on mother's chest. Cord blood was then collected. The placenta then delivered spontaneously intact, and a three vessel cord was noted. Uterine message and pitocin 20 units IV was given until the fundus was firm. The cervix, vagina, perineum, and rectum were carefully inspected for lacerations and left labial laceration noted. This was repaired in running locked fashion with 3-0 Vicryl to achieve hemostasis. Counts for needles, sponges, laps and instruments were correct times two at the end of the delivery. There were no sponges left in the vagina. I was present and scrubbed for the entire delivery. There were no major complications. Mother and baby were bonding well at the end of the delivery.      Infant     Findings: male infant     Infant observations: Weight: No birth weight on file.  Length:   in  Observations/Comments:        Apgars: 8  @ 1 minute /    9  @ 5 minutes   Infant Name: Marlo     Placenta & Cord         Placenta delivered  Spontaneous at   3/15/2025 11:19 AM    Cord: 3 vessels present.   Nuchal Cord?  no   Cord blood obtained: Yes   Cord gases obtained:  No   Cord gas results: Venous:  No results found for: \"PHCVEN\", \"BECVEN\"    Arterial:  No results found for: \"PHCART\", \"BECART\"     Repair     Episiotomy: None    No    Lacerations: Yes  Laceration Information  Laceration Repaired? "   Perineal: None     Periurethral:       Labial: left Yes   Sulcus:       Vaginal:       Cervical:         Suture used for repair: 3-0 Vicryl  Laceration Length for 3rd or 4th degree lacerations: n/a    Estimated Blood Loss:       Quantitative Blood Loss: Quantitative Blood Loss (mL): 100 mL (03/15/25 1130)        Complications     none    Disposition     Mother to Mother Baby/Postpartum  in stable condition currently.  Baby to remains with mom  in stable condition currently.    Demetrice Flores MD  03/15/25  11:41 EDT          Electronically signed by Demetrice Flores MD at 03/15/25 1149          Physician Progress Notes (last 72 hours)        Dejuan Robert MD at 25 1425          [facility]  : 2000  MRN: [id number]  CSN: [id number]    Hospital Day: 3    Postpartum Day #1  Subjective     CC: hospital follow-up    Her pain is well controlled. Vaginal bleeding is normal in amount. She is ambulating without difficulty. She is passing gas. She is voiding without difficulty. Her baby is doing well..   She reports bleeding as decreased.  She reports pain control as adequate.  She is voiding with no difficulty. She is breast feeding.    Objective     Min/max vitals past 24 hours:   Temp  Min: 97.9 °F (36.6 °C)  Max: 98.7 °F (37.1 °C)  BP  Min: 116/69  Max: 126/65  Pulse  Min: 73  Max: 84  Resp  Min: 16  Max: 17        General:  Psych:                 comfortable, well appearing, and in no acute distress  mood and affect are within normal limits   Abdomen: soft, non-tender; no masses  no umbilical or inguinal hernias are present  no hepato-splenomegaly  fundus firm and non-tender   Pelvic: Not performed   Ext: Calves NT, negative by clinical screen.     Lab Results   Component Value Date    WBC 12.04 (H) 2025    HGB 11.9 (L) 2025    HCT 36.4 2025    MCV 86.9 2025     2025    RH Positive 2025    HEPBSAG Negative 10/10/2024       Assessment    Postpartum Day #1 S/P vaginal delivery  Doing well  Male infant.  Undecided about circumcision.  She asked appropriate questions and I told her about the [employer] pediatric[employer] position statement that the benefits do outweigh the risks but that it is still an elective procedure.  We discussed the possible benefits versus the risks and the procedure itself and she will hold off at least until tomorrow if she wants to do it.    Plan   Continue routine postpartum care  Anticipate discharge home on postpartum day 2.    Dejuan Robert MD  3/16/2025  14:26 EDT      Electronically signed by Dejuan Robert MD at 25 1429       Demetrice Flores MD at 03/15/25 0835          OB Progress Note    S: Patient is resting comfortably with an epidural. She denies any complaints currently.     O:   Temp:  [97.7 °F (36.5 °C)-98.6 °F (37 °C)] 97.7 °F (36.5 °C)  Heart Rate:  [67-97] 67  Resp:  [16-18] 16  BP: ()/(47-88) 104/49    Gen: well appearing, NAD  Abd: soft, non-tender, gravid  SVE: 6-7/80/0     FHT: 130s baseline, moderate variability, accels +, decels -. Category 1  [place]: Q 2-4 mins     A/P: Merly Varner is a 24 y.o.  at 37w5d who is admitted for labor with SROM since 1430 yesterday.   - She was admitted and starting on pitocin for augmentation.   - She has an epidural in place for pain control.  - Continue on CEFM and tocometry.  - Will monitor labor progress and place an IUPC at next cervical check if she is not making progress.  - Anticipate a vaginal delivery at this time.    Demetrice Flores MD      Electronically signed by Demetrice Flores MD at 03/15/25 9428       Felipe Marsh MD at 25 5212          The patient is feeling mild pain from contractions  Fetal heart tones are reactive  The cervix is 80% effaced and 5 cm dilated with the presenting part at -1    Good early progress in labor.  Will allow labor to progress.  Augment if needed.    Electronically signed by Felipe Marsh  MD ANGY at 03/14/25 6710       Consult Notes (last 72 hours)  Notes from 03/14/25 0759 through 03/17/25 0759   No notes of this type exist for this encounter.

## 2025-03-17 NOTE — LACTATION NOTE
This note was copied from a baby's chart.  P1 term baby. Mom reports she is breast feeding then supplementing baby with formula.   Discussed milk production,  encouraged pumping every 3hrs and to call for any assistance.

## 2025-03-18 ENCOUNTER — MATERNAL SCREENING (OUTPATIENT)
Dept: CALL CENTER | Facility: HOSPITAL | Age: 25
End: 2025-03-18
Payer: COMMERCIAL

## 2025-03-18 NOTE — OUTREACH NOTE
Maternal Screening Survey      Flowsheet Row Responses   Eligibility Eligible   Prep survey completed? Yes   Facility patient discharged from? [facility] - Registered Nurse

## 2025-03-25 NOTE — PROGRESS NOTES
"Continued Stay Note  [facility]     Patient Name: Merly Varner  MRN: [id number]  Today's Date: 3/25/2025    Admit Date: 3/14/2025    Plan: Infant may discharge to mother when medically ready; CSW will follow cord tox. ROXANE Flynn.   Discharge Plan       Row Name 03/25/25 1105       Plan    Plan Comments Mother: Merly Varner, MRN: [id number]; infant: Deanna \"Marlo\" Varner, MRN: [id number]. CSW reviewed cord toxicology for infant, and it was positive for Delta-9 Carboxy THC; this is lab confirmed. CSW submitted a CPS report (WebID # [id number]). ROXANE Flynn.                   Discharge Codes    No documentation.                 Expected Discharge Date and Time       Expected Discharge Date Expected Discharge Time    Mar 17, 2025               RAGHU Ortiz    "

## 2025-03-26 ENCOUNTER — MATERNAL SCREENING (OUTPATIENT)
Dept: CALL CENTER | Facility: HOSPITAL | Age: 25
End: 2025-03-26
Payer: COMMERCIAL

## 2025-03-26 NOTE — OUTREACH NOTE
Maternal Screening Survey      Flowsheet Row Responses   Facility patient discharged from? [place]   Attempt successful? No   Unsuccessful attempts Attempt 1              FRANK VALERA - Registered Nurse

## 2025-03-26 NOTE — OUTREACH NOTE
Maternal Screening Survey      Flowsheet Row Responses   Facility patient discharged from? [place]   Attempt successful? No   Unsuccessful attempts Attempt 2              FRANK VALERA - Registered Nurse

## 2025-03-27 ENCOUNTER — MATERNAL SCREENING (OUTPATIENT)
Dept: CALL CENTER | Facility: HOSPITAL | Age: 25
End: 2025-03-27
Payer: COMMERCIAL

## 2025-03-27 NOTE — OUTREACH NOTE
Maternal Screening Survey      Flowsheet Row Responses   Facility patient discharged from? [facility]   Attempt successful? No   Unsuccessful attempts Attempt 3   Revoke Unable to reach   oke Unable to reach              Jackelyn S - Registered Nurse

## (undated) DEVICE — LEGGINGS, PAIR, 29X43, STERILE: Brand: MEDLINE

## (undated) DEVICE — GLV SURG BIOGEL LTX PF 6 1/2

## (undated) DEVICE — SOL BETADINE 8OZ

## (undated) DEVICE — DRAPE,REIN 53X77,STERILE: Brand: MEDLINE

## (undated) DEVICE — Device

## (undated) DEVICE — BHS STANDARD VAGINAL DELIVERY: Brand: MEDLINE INDUSTRIES, INC.

## (undated) DEVICE — SUT MERSLN 5MM MO4 12IN WHT RS23

## (undated) DEVICE — TUBING, SUCTION, 1/4" X 20', STRAIGHT: Brand: MEDLINE INDUSTRIES, INC.

## (undated) DEVICE — RED RUBBER URETHRAL CATHETER: Brand: DOVER

## (undated) DEVICE — CARDINAL HEALTH FLEXIBLE LIGHT HANDLE COVER GREEN: Brand: CARDINAL HEALTH

## (undated) DEVICE — IRRIGATOR BULB ASEPTO 60CC STRL

## (undated) DEVICE — MEDI-VAC YANKAUER SUCTION HANDLE W/BULBOUS TIP: Brand: CARDINAL HEALTH